# Patient Record
Sex: FEMALE | Race: WHITE | NOT HISPANIC OR LATINO | Employment: UNEMPLOYED | URBAN - NONMETROPOLITAN AREA
[De-identification: names, ages, dates, MRNs, and addresses within clinical notes are randomized per-mention and may not be internally consistent; named-entity substitution may affect disease eponyms.]

---

## 2018-04-11 ENCOUNTER — OFFICE VISIT CONVERTED (OUTPATIENT)
Dept: FAMILY MEDICINE CLINIC | Age: 63
End: 2018-04-11
Attending: FAMILY MEDICINE

## 2018-12-06 ENCOUNTER — OFFICE VISIT CONVERTED (OUTPATIENT)
Dept: FAMILY MEDICINE CLINIC | Age: 63
End: 2018-12-06
Attending: FAMILY MEDICINE

## 2018-12-10 ENCOUNTER — CONVERSION ENCOUNTER (OUTPATIENT)
Dept: OTHER | Facility: HOSPITAL | Age: 63
End: 2018-12-10

## 2019-10-17 ENCOUNTER — HOSPITAL ENCOUNTER (OUTPATIENT)
Dept: OTHER | Facility: HOSPITAL | Age: 64
Discharge: HOME | End: 2019-10-17
Attending: FAMILY MEDICINE

## 2019-10-17 LAB
ALBUMIN SERPL-MCNC: 4.6 G/DL (ref 3.5–5)
ALBUMIN/GLOB SERPL: 1.9 {RATIO} (ref 1.4–2.6)
ALP SERPL-CCNC: 59 U/L (ref 43–160)
ALT SERPL-CCNC: 34 U/L (ref 10–40)
ANION GAP SERPL CALC-SCNC: 19 MMOL/L (ref 8–19)
AST SERPL-CCNC: 25 U/L (ref 15–50)
BILIRUB SERPL-MCNC: 0.5 MG/DL (ref 0.2–1.3)
BUN SERPL-MCNC: 15 MG/DL (ref 5–25)
BUN/CREAT SERPL: 18 {RATIO} (ref 6–20)
CALCIUM SERPL-MCNC: 9.9 MG/DL (ref 8.7–10.4)
CHLORIDE SERPL-SCNC: 101 MMOL/L (ref 99–111)
CHOLEST SERPL-MCNC: 187 MG/DL (ref 107–200)
CHOLEST/HDLC SERPL: 3.4 {RATIO} (ref 3–6)
CONV CO2: 22 MMOL/L (ref 22–32)
CONV TOTAL PROTEIN: 7 G/DL (ref 6.3–8.2)
CREAT UR-MCNC: 0.82 MG/DL (ref 0.5–0.9)
GFR SERPLBLD BASED ON 1.73 SQ M-ARVRAT: >60 ML/MIN/{1.73_M2}
GLOBULIN UR ELPH-MCNC: 2.4 G/DL (ref 2–3.5)
GLUCOSE SERPL-MCNC: 120 MG/DL (ref 65–99)
HDLC SERPL-MCNC: 55 MG/DL (ref 40–60)
LDLC SERPL CALC-MCNC: 93 MG/DL (ref 70–100)
OSMOLALITY SERPL CALC.SUM OF ELEC: 288 MOSM/KG (ref 273–304)
POTASSIUM SERPL-SCNC: 4.3 MMOL/L (ref 3.5–5.3)
SODIUM SERPL-SCNC: 138 MMOL/L (ref 135–147)
TRIGL SERPL-MCNC: 195 MG/DL (ref 40–150)
VLDLC SERPL-MCNC: 39 MG/DL (ref 5–37)

## 2019-11-06 ENCOUNTER — OFFICE VISIT CONVERTED (OUTPATIENT)
Dept: FAMILY MEDICINE CLINIC | Age: 64
End: 2019-11-06
Attending: FAMILY MEDICINE

## 2020-10-29 ENCOUNTER — HOSPITAL ENCOUNTER (OUTPATIENT)
Dept: OTHER | Facility: HOSPITAL | Age: 65
Discharge: HOME | End: 2020-10-29
Attending: FAMILY MEDICINE

## 2020-10-29 LAB
ALBUMIN SERPL-MCNC: 4.3 G/DL (ref 3.5–5)
ALBUMIN/GLOB SERPL: 1.9 {RATIO} (ref 1.4–2.6)
ALP SERPL-CCNC: 57 U/L (ref 43–160)
ALT SERPL-CCNC: 17 U/L (ref 10–40)
ANION GAP SERPL CALC-SCNC: 14 MMOL/L (ref 8–19)
AST SERPL-CCNC: 16 U/L (ref 15–50)
BILIRUB SERPL-MCNC: 0.56 MG/DL (ref 0.2–1.3)
BUN SERPL-MCNC: 22 MG/DL (ref 5–25)
BUN/CREAT SERPL: 24 {RATIO} (ref 6–20)
CALCIUM SERPL-MCNC: 10 MG/DL (ref 8.7–10.4)
CHLORIDE SERPL-SCNC: 102 MMOL/L (ref 99–111)
CHOLEST SERPL-MCNC: 134 MG/DL (ref 107–200)
CHOLEST/HDLC SERPL: 2.5 {RATIO} (ref 3–6)
CONV CO2: 26 MMOL/L (ref 22–32)
CONV TOTAL PROTEIN: 6.6 G/DL (ref 6.3–8.2)
CREAT UR-MCNC: 0.91 MG/DL (ref 0.5–0.9)
GFR SERPLBLD BASED ON 1.73 SQ M-ARVRAT: >60 ML/MIN/{1.73_M2}
GLOBULIN UR ELPH-MCNC: 2.3 G/DL (ref 2–3.5)
GLUCOSE SERPL-MCNC: 105 MG/DL (ref 65–99)
HDLC SERPL-MCNC: 54 MG/DL (ref 40–60)
LDLC SERPL CALC-MCNC: 66 MG/DL (ref 70–100)
OSMOLALITY SERPL CALC.SUM OF ELEC: 290 MOSM/KG (ref 273–304)
POTASSIUM SERPL-SCNC: 4.2 MMOL/L (ref 3.5–5.3)
SODIUM SERPL-SCNC: 138 MMOL/L (ref 135–147)
TRIGL SERPL-MCNC: 71 MG/DL (ref 40–150)
VLDLC SERPL-MCNC: 14 MG/DL (ref 5–37)

## 2020-11-05 ENCOUNTER — OFFICE VISIT CONVERTED (OUTPATIENT)
Dept: FAMILY MEDICINE CLINIC | Age: 65
End: 2020-11-05
Attending: FAMILY MEDICINE

## 2020-12-16 ENCOUNTER — OFFICE VISIT CONVERTED (OUTPATIENT)
Dept: FAMILY MEDICINE CLINIC | Age: 65
End: 2020-12-16
Attending: FAMILY MEDICINE

## 2021-04-26 ENCOUNTER — HOSPITAL ENCOUNTER (OUTPATIENT)
Dept: OTHER | Facility: HOSPITAL | Age: 66
Discharge: HOME | End: 2021-04-26
Attending: FAMILY MEDICINE

## 2021-04-26 ENCOUNTER — OFFICE VISIT CONVERTED (OUTPATIENT)
Dept: FAMILY MEDICINE CLINIC | Age: 66
End: 2021-04-26
Attending: FAMILY MEDICINE

## 2021-04-27 ENCOUNTER — HOSPITAL ENCOUNTER (OUTPATIENT)
Dept: OTHER | Facility: HOSPITAL | Age: 66
Discharge: HOME | End: 2021-04-27
Attending: FAMILY MEDICINE

## 2021-04-29 LAB
CONV LAST MENSTURAL PERIOD: NORMAL
HPV HYBRID CAPTURE HIGH RISK: NEGATIVE
SPECIMEN SOURCE: NORMAL
SPECIMEN SOURCE: NORMAL
THIN PREP CVX: NORMAL

## 2021-05-18 NOTE — PROGRESS NOTES
Senait Fong  1955     Office/Outpatient Visit    Visit Date: Wed, Dec 16, 2020 10:08 am    Provider: Yas Castillo MD (Assistant: Jennifer Espinosa, RN)    Location: [facility]        Electronically signed by Yas Castillo MD on  12/17/2020 04:41:19 PM                             Subjective:        CC: Joanna is a 65 year old White female.  [facility] [place] [facility]; [employer] VIDEO- [phone]        HPI:           Joanna is here for a Medicare wellness visit.  The required HRA questions are integrated within this visit note. Family medical history and individual medical/surgical history were reviewed and updated.  A current height, weight, BMI, blood pressure, and pulse were recorded in the vitals section of the note and have been reviewed. Patient's medications, including supplements, were recorded in the chart and reviewed.  Current providers and suppliers were reviewed and updated.          Self-Assessment of Health: She rates her health as excellent. She rates her confidence of being able to control/manage most of her health problems as very confident. Her physical/emotional health has limited her social activites not at all.  A review of cognitive impairment was performed, including ability to drive a car, manage finances, and any memory changes, and was found to be negative.  A review of functional ability, including bathing, dressing, walking, and urine/bowel continence as well as level of safety was performed and was found to be negative.  Falls Risk: Has not had any falls or only one fall without injury in the past year.  In regard to hearing, she reports having trouble hearing the TV/radio when others do not and having to strain to hear or understand conversations, but not wearing hearing aid(s).  Concerning home safety, she reports that at home she DOES have adequate lighting, a skid resistant shower/tub, grab bars in the bath, handrails on stairs and functioning smoke alarms, but not  absence of throw rugs.          Immunization Status: ** >10 years since last Td booster; ** Has not received pneumococcal vaccination; ** Has not received Prevnar 13 vaccination; Age>60, no shingles vaccination; Physical Activity: She exercises for at least 20 minutes 3 or more days/week.; Type of diet patient normally eats is described as well-balanced with fruits and vegetables Tobacco: She has a past history of cigarette smoking; quit date:  .  Preventative Health updated today           PHQ-9 Depression Screening: Completed form scanned and in chart; Total Score 0     ROS:     CONSTITUTIONAL:  Positive for fatigue.   Negative for chills or fever.      E/N/T:  Negative for hearing problems, E/N/T pain, congestion, rhinorrhea, epistaxis, hoarseness, and dental problems.      CARDIOVASCULAR:  Negative for chest pain.      RESPIRATORY:  Negative for cough, dyspnea, and hemoptysis.  RECENT COVID EXPOSURE WITH NORMAL COVID EXAM    GASTROINTESTINAL:  Negative for abdominal pain, heartburn, constipation, diarrhea, and stool changes.      GENITOURINARY:  Negative for dysuria and vaginal discharge.      INTEGUMENTARY/BREAST:  Negative for rash.      NEUROLOGICAL:  Negative for dizziness, headaches, paresthesias, and weakness.      PSYCHIATRIC:  Negative for anxiety, depression, and sleep disturbances.          Past Medical History / Family History / Social History:         Last Reviewed on 2020 10:59 AM by Yas Castillo    Past Medical History:                 PAST MEDICAL HISTORY         Hyperlipidemia     Skin cancer: UNKNOWN;     depression         GYNECOLOGICAL HISTORY:     miscarriage 1    Menopause at age 51.          PREVENTIVE HEALTH MAINTENANCE             BONE DENSITY: was last done 12-10-18 with normal results     COLORECTAL CANCER SCREENING: colonoscopy with the following abnormalities noted-- Diverticulosis; 3/4/11     EYE EXAM: was last done      MAMMOGRAM: was last done 20      PAP SMEAR: was last done 12/6/18 hx abnormal, biopsy normal         PAST MEDICAL HISTORY             CURRENT MEDICAL PROVIDERS:    Dentist: dr collazo    Ophthalmologist: Albert         Surgical History:     bunionectomy Jan 2015 L, Sept 2015 R     Uterine biopsy 2015         Cholecystectomy     breast bx nml; Procedures: colonoscopy 2011 Prior gynecologic procedures include cervical cryotherapy x2.          Family History:         Positive for Myocardial Infarction ( pat. GF ).      Positive for Colon Cancer ( mat. GM ).      Positive for COPD ( mat. GF ).      Positive for Crohn's Disease ( mother ).          Social History:     Occupation: Unemployed     Marital Status:      Children: 2 children     Exercise: Primary form of exercise is walking and gym at home.          Tobacco/Alcohol/Supplements:     Last Reviewed on 12/16/2020 10:09 AM by Jennifer Espinosa    Tobacco: She has a past history of cigarette smoking; quit date:  1997.      Caffeine:  She admits to consuming caffeine via soda ( 1 serving per day ).          Substance Abuse History:     Last Reviewed on 2/10/2017 09:10 AM by Cally Marcano    NEGATIVE         Mental Health History:     Last Reviewed on 2/10/2017 09:10 AM by Cally Marcano        Communicable Diseases (eg STDs):     Last Reviewed on 2/10/2017 09:10 AM by Cally Marcano        Immunizations:     influenza, high-dose, quadrivalent (FLUZONE HIGH-DOSE QUAD 2020-21) 11/5/2020        Allergies:     Last Reviewed on 12/16/2020 10:09 AM by Jennifer Espinosa    Codeine Sulfate:          Current Medications:     Last Reviewed on 12/16/2020 10:09 AM by Jennifer Espinosa    Loratadine 10 mg oral tablet [1 tab daily]    citalopram 20 mg oral tablet [TAKE 1 TABLET AT BEDTIME]    cranberry     Multivitamin daily     melatonin 5 mg 2 po qhs     Fish Oil 360-1,200 mg oral capsule [Take 1 capsule(s) by mouth daily]    OTC calcium chew     gummy probiotic         Objective:        Vitals:         Current:  2/8/2011 2:56:11 PM    Ht:  5 ft, 2.5 in;  Wt: 148 lbs;  BMI: 26.6T: 98 F (temporal);  BP: 121/73 mm Hg (left arm, sitting);  R: 16 bpm;  sCr: 0.78 mg/dL;  GFR: 69.05        Exams:     PHYSICAL EXAM:     GENERAL:  well developed and nourished; appropriately groomed; in no apparent distress;     EYES: extraocular movements intact; conjunctiva and cornea are normal;     RESPIRATORY: normal respiratory rate and pattern with no distress;     MUSCULOSKELETAL: normal overall tone     NEUROLOGIC: mental status: alert and oriented x 3;     PSYCHIATRIC: appropriate affect and demeanor; normal psychomotor function; normal speech pattern;         Lab/Test Results:         Glucose, Serum: 105 (mg/dL) (10/29/2020),     Creatinine, Serum: 0.91 (mg/dl) (10/29/2020),     Glom Filt Rate, Est: >60 (ml/min/1.73m2) (10/29/2020),     Alkaline Phosphatase, Serum: 57 (U/L) (10/29/2020),     ALT (SGPT): 17 (U/L) (10/29/2020),     AST (SGOT): 16 (U/L) (10/29/2020),     Total Cholesterol: 134 (mg/dL) (10/29/2020),     HDL: 54 (mg/dL) (10/29/2020),     Triglycerides: 71 (mg/dL) (10/29/2020),     LDL: 66 (mg/dL) (10/29/2020),             Assessment:         Z00.00   Encounter for general adult medical examination without abnormal findings       Z13.31   Encounter for screening for depression       Z78.0   Asymptomatic menopausal state           ORDERS:         Radiology/Test Orders:       3017F  Colorectal CA screen results documented and reviewed (PV)  (In-House)            49117  DXA, bone density study, 1 or more sites; axial skeleton (eg hips, pelvis, spine)  (Send-Out)              Procedures Ordered:         Annual wellness visit, includes a PPPS, subsequent visit  (In-House)              Other Orders:         Depression screen negative  (In-House)            1101F  Pt screen for fall risk; document no falls in past year or only 1 fall w/o injury in past year (IVA)  (In-House)              Screening mammogram results  documented  (Send-Out)                      Plan:         Encounter for general adult medical examination without abnormal findingsMEDICARE WELLNESS EXAM-SHE IS due for her MAMMOGRAM/DEXA in April, UTD ON COLONOSCOPY, due for  PAP AND PELVIC EXAM NOW, SHE DEFERS VACCINATIONS INCLUDING:  SHINGLES VACCINE/PREVNAR/ PNEUMOVAX/FLU VACCINE, NO FALL RISK, NO MEMORY ISSUES, NO SIGNS/SYMPTOMS OF DEPRESSION, SHE LIVES WITH HER , SHE IS ABLE TO DRIVE AND PERFORM ADL'S/FINANCES INDEPENDENTLY, HEARING IS DECREASED-PT DEFERS SCREENING TEST, SHE HAS A LIVING WILL AND A PREV SERVICES HANDOUT AND SAFETY HANDOUT WERE GIVEN TO HER.  CURRENT DOCTOR LIST UPDATED.  RECOMMEND YEARLY EYE EXAMS AND  WT IS STABLE    ADVANCED DIRECTIVES: None               Orders:         Annual wellness visit, includes a PPPS, subsequent visit  (In-House)              Encounter for screening for depression    MIPS PHQ-9 Depression Screening: Completed form scanned and in chart; Total Score 0; Negative Depression Screen           Orders:         Depression screen negative  (In-House)            1101F  Pt screen for fall risk; document no falls in past year or only 1 fall w/o injury in past year (IVA)  (In-House)              Screening mammogram results documented  (Send-Out)            3017F  Colorectal CA screen results documented and reviewed (PV)  (In-House)              Asymptomatic menopausal state          Orders:       13586  DXA, bone density study, 1 or more sites; axial skeleton (eg hips, pelvis, spine)  (Send-Out)                  Charge Capture:         Primary Diagnosis:     Z00.00  Encounter for general adult medical examination without abnormal findings           Orders:        Annual wellness visit, includes a PPPS, subsequent visit  (In-House)              Z13.31  Encounter for screening for depression           Orders:        Depression screen negative  (In-House)            1101F  Pt screen for fall risk;  document no falls in past year or only 1 fall w/o injury in past year (IVA)  (In-House)            3017F  Colorectal CA screen results documented and reviewed (PV)  (In-House)              Z78.0  Asymptomatic menopausal state

## 2021-05-18 NOTE — PROGRESS NOTES
Senait Fong  1955     Office/Outpatient Visit    Visit Date: Mon, Apr 26, 2021 01:10 pm    Provider: Yas Castillo MD (Assistant: Analia Brooke MA)    Location: [facility]        Electronically signed by Yas Castillo MD on  04/27/2021 03:48:23 PM                             Subjective:        CC: WWE    HPI:           Joanna presents with encounter for gynecological examination (general) (routine) without abnormal findings.  Her last physical exam was 6 months ago.  She is menopausal.  She is not currently using any form of contraception.  She performs breast self-exams occasionally.   Her last Pap smear was in 12-6-18 and was normal.   Her last mammogram was in 4-26-21.   Her last DEXA was in 4-26-21.   She underwent colonoscopy in 2011.   Preventative Health updated today.  Joanna has had an abnormal Pap smear in the past.  Tobacco: Former smoker           PHQ-9 Depression Screening: Completed form scanned and in chart; Total Score 0     ROS:     CONSTITUTIONAL:  Negative for chills, fatigue and fever.      E/N/T:  Negative for hearing problems, E/N/T pain, congestion, rhinorrhea, epistaxis, hoarseness, and dental problems.      CARDIOVASCULAR:  Negative for chest pain.      RESPIRATORY:  Negative for cough, dyspnea, and hemoptysis.      GASTROINTESTINAL:  Negative for abdominal pain, heartburn, constipation, diarrhea, and stool changes.      GENITOURINARY:  Negative for dysuria and vaginal discharge.      INTEGUMENTARY/BREAST:  Negative for rash.      NEUROLOGICAL:  Negative for dizziness, headaches, paresthesias, and weakness.      PSYCHIATRIC:  Negative for anxiety, depression, and sleep disturbances.          Past Medical History / Family History / Social History:         Last Reviewed on 4/26/2021 01:32 PM by Yas Castillo    Past Medical History:                 PAST MEDICAL HISTORY         Hyperlipidemia     Skin cancer: UNKNOWN;     depression          GYNECOLOGICAL HISTORY:     miscarriage 1    Menopause at age 51.          PREVENTIVE HEALTH MAINTENANCE             BONE DENSITY: was last done 12-10-18 with normal results     COLORECTAL CANCER SCREENING: colonoscopy with the following abnormalities noted-- Diverticulosis; 3/4/11     EYE EXAM: was last done 2019-MD in [facility]     MAMMOGRAM: was last done 20     PAP SMEAR: was last done 18 hx abnormal, biopsy normal         PAST MEDICAL HISTORY             CURRENT MEDICAL PROVIDERS:    Dentist: dr collazo    Ophthalmologist: Albert         Surgical History:     bunionectomy 2015 L, 2015 R     Uterine biopsy          Cholecystectomy     breast bx nml; Procedures: colonoscopy  Prior gynecologic procedures include cervical cryotherapy x2.          Family History:         Positive for Myocardial Infarction ( pat. GF ).      Positive for Colon Cancer ( mat. GM ).      Positive for COPD ( mat. GF ).      Positive for Crohn's Disease ( mother ).          Social History:     Occupation: Unemployed     Marital Status:      Children: 2 children     Exercise: Primary form of exercise is walking and gym at home.          Tobacco/Alcohol/Supplements:     Last Reviewed on 2021 01:18 PM by Analia Brooke    Tobacco: She has a past history of cigarette smoking; quit date:  .      Caffeine:  She admits to consuming caffeine via soda ( 1 serving per day ).          Substance Abuse History:     Last Reviewed on 2/10/2017 09:10 AM by Cally Marcano    NEGATIVE         Mental Health History:     Last Reviewed on 2/10/2017 09:10 AM by Cally Marcano        Communicable Diseases (eg STDs):     Last Reviewed on 2/10/2017 09:10 AM by Cally Marcano        Allergies:     Last Reviewed on 2021 01:18 PM by Analia Brooke    Codeine Sulfate:          Current Medications:     Last Reviewed on 2021 01:18 PM by Analia Brooke    Loratadine 10 mg oral tablet [1 tab daily]    citalopram 20 mg  oral tablet [TAKE 1 TABLET AT BEDTIME]    cranberry     Multivitamin daily     melatonin 5 mg 2 po qhs     Fish Oil 360-1,200 mg oral capsule [Take 1 capsule(s) by mouth daily]    OTC calcium chew     gummy probiotic         Objective:        Vitals:         Current: 4/26/2021 1:14:53 PM    Ht:  5 ft, 1.75 in;  Wt: 153.4 lbs;  BMI: 28.3T: 97.3 F (temporal);  BP: 127/68 mm Hg (left arm, sitting);  P: 54 bpm (left arm (BP Cuff), sitting);  sCr: 0.91 mg/dL;  GFR: 58.79        Exams:     PHYSICAL EXAM:     GENERAL: vital signs recorded - well developed, well nourished;  no apparent distress;     EYES: extraocular movements intact; conjunctiva and cornea are normal; PERRLA;     E/N/T:  normal EACs, TMs, nasal/oral mucosa, teeth, gingiva, and oropharynx;     NECK: range of motion is normal; thyroid is non-palpable;     RESPIRATORY: normal respiratory rate and pattern with no distress; normal breath sounds with no rales, rhonchi, wheezes or rubs;     CARDIOVASCULAR: normal rate; rhythm is regular;  no systolic murmur; no edema;     GASTROINTESTINAL: nontender; normal bowel sounds; no organomegaly; rectal exam: normal tone; nontender, guaiac negative stool;     GENITOURINARY:  normal appearance of external genitalia, urethra, and cervix; no cervical motion tenderness; no adnexal tenderness or masses on bimanual exam;     LYMPHATIC: no enlargement of cervical or facial nodes; no supraclavicular nodes;     BREAST/INTEGUMENT: BREASTS: breast exam is normal without masses, skin changes, or nipple discharge; SKIN: no significant rashes or lesions; no suspicious moles;     MUSCULOSKELETAL:  Normal range of motion, strength and tone;     NEUROLOGICAL:  cranial nerves, motor and sensory function, reflexes, gait and coordination are all intact;     PSYCHIATRIC:  appropriate affect and demeanor; normal speech pattern; grossly normal memory;         Lab/Test Results:         Glucose, Urine: Neg (04/26/2021),     Bilirubin, urine:  Negative (04/26/2021),     Ketones, Urine Strip: Negative (04/26/2021),     Specific Gravity, urine: 1.025 (04/26/2021),     Blood in Urine: negative (04/26/2021),     pH, urine: 6.5 (04/26/2021),     Protein Urine QL: negative (04/26/2021),     Urobilinogen, urine: 0.2 E.U./dL (04/26/2021),     Nitrite, Urine: Negative (04/26/2021),     Leukoctyes, urine: Negative (04/26/2021),     Appearance: Clear (04/26/2021),     collection source: Clean-catch (04/26/2021),     Color: Yellow (04/26/2021),     Performed by:: al (04/26/2021),             Assessment:         Z01.419   Encounter for gynecological examination (general) (routine) without abnormal findings       Z13.31   Encounter for screening for depression       Z12.31   Encounter for screening mammogram for malignant neoplasm of breast       Z78.0   Asymptomatic menopausal state       Z12.11   Encounter for screening for malignant neoplasm of colon       N90.5   Atrophy of vulva           ORDERS:         Meds Prescribed:       [Recorded] estradioL 0.01 % (0.1 mg/gram) Vaginal Cream [insert 1 gram by vaginal route daily for 1 week, 2 x per week x 2 weeks then once a week]       [Refilled] estradioL 0.01 % (0.1 mg/gram) Vaginal Cream [insert 1 gram by vaginal route daily for 1 week, 2 x per week x 2 weeks then once a week], #42.5 (forty two point five) grams, Refills: 2 (two)         Radiology/Test Orders:       3017F  Colorectal CA screen results documented and reviewed (PV)  (In-House)            00166  Screening digital breast tomosynthesis bi  (Send-Out)            12888  Colonoscopy, flexible; screening-Dr Angelo  (Send-Out)            64749  DXA, bone density study, 1 or more sites; axial skeleton (eg hips, pelvis, spine)  (Send-Out)              Lab Orders:       74270  Urinalysis, automated, without microscopy  (In-House)              Procedures Ordered:       21242  Cytopathology, cervix/vagina collect in preservative, auto thin layer prep, arthur Mario  supervision  (Send-Out)            54553  Handlg&/or convey of spec for TR office to lab  (In-House)              Other Orders:         Depression screen negative  (In-House)              Screening mammogram results documented  (Send-Out)                      Plan:         Encounter for gynecological examination (general) (routine) without abnormal findingshealthy, recommend yearly flu vaccination, pap performed, breast exam WNL, she is refered for screening colonoscopoy/mammogram and dexa          Orders:       95497  Urinalysis, automated, without microscopy  (In-House)            67413  Cytopathology, cervix/vagina collect in preservative, auto thin layer prep, racheln w/MD supervision  (Send-Out)            67652  Handlg&/or convey of spec for TR office to lab  (In-House)              Encounter for screening for depression    MIPS PHQ-9 Depression Screening: Completed form scanned and in chart; Total Score 0; Negative Depression Screen           Orders:         Depression screen negative  (In-House)              Screening mammogram results documented  (Send-Out)            3017F  Colorectal CA screen results documented and reviewed (PV)  (In-House)              Encounter for screening mammogram for malignant neoplasm of breast          Orders:       41328  Screening digital breast tomosynthesis bi  (Send-Out)              Asymptomatic menopausal state          Orders:       26285  DXA, bone density study, 1 or more sites; axial skeleton (eg hips, pelvis, spine)  (Send-Out)              Encounter for screening for malignant neoplasm of colonshe is due 2021-will schedule in the Fall        TESTS/PROCEDURES:  Will proceed with a colonoscopy to be performed/scheduled now.            Orders:       83561  Colonoscopy, flexible; screening-Dr Angelo  (Send-Out)              Atrophy of vulva          Prescriptions:       [Recorded] estradioL 0.01 % (0.1 mg/gram) Vaginal Cream [insert 1 gram by vaginal  route daily for 1 week, 2 x per week x 2 weeks then once a week]       [Refilled] estradioL 0.01 % (0.1 mg/gram) Vaginal Cream [insert 1 gram by vaginal route daily for 1 week, 2 x per week x 2 weeks then once a week], #42.5 (forty two point five) grams, Refills: 2 (two)             Charge Capture:         Primary Diagnosis:     Z01.419  Encounter for gynecological examination (general) (routine) without abnormal findings           Orders:      75995  Urinalysis, automated, without microscopy  (In-House)            36736  Handlg&/or convey of spec for TR office to lab  (In-House)              Z13.31  Encounter for screening for depression           Orders:        Depression screen negative  (In-House)            3017F  Colorectal CA screen results documented and reviewed (PV)  (In-House)              Z12.31  Encounter for screening mammogram for malignant neoplasm of breast     Z78.0  Asymptomatic menopausal state     Z12.11  Encounter for screening for malignant neoplasm of colon     N90.5  Atrophy of vulva

## 2021-05-18 NOTE — PROGRESS NOTES
Senait Fong 1955     Office/Outpatient Visit    Visit Date: Thu, Dec 6, 2018 10:49 am    Provider: Yas Castillo MD (Assistant: Hoa Parsons MA)    Location: [facility]        Electronically signed by Yas Castillo MD on  12/10/2018 09:34:50 AM                             SUBJECTIVE:        CC: (PATIENT IS NOT TAKING VIACTIVE 1000)         HPI:         Her last gynecological exam was one year ago.  Her last Pap smear was 1 year ago.  Her last mammogram was 1 year ago.  DEXA scan on over two years ago and was normal..  She is not currently using any form of contraception.  She performs breast self-exams occasionally.  ( PATIENT STATES SHE HAD PAP AND MAMMO LAST YEAR ORDERED BY DR. RAVIN PATRICK) She is current with her Td.  She is not current with influenza immunization.  COLONOSCOPY NO POLYPS 2011 DR HUMPHREY             PHQ-9 Depression Screening: Completed form scanned and in chart; Total Score 0 Alcohol Consumption Screening: Completed form scanned and in chart; Total Score 4         Dx with mixed hyperlipidemia; current treatment includes Crestor,  a low cholesterol/low fat diet, and fish oil.  Compliance with treatment has been good; she takes her medication as directed, maintains her low cholesterol diet, follows up as directed, and maintains her exercise regimen.  She denies experiencing any hypercholesterolemia related symptoms.  Most recent lab tests include Creatinine, Serum:  0.81 (mg/dl) (12/05/2018), Glom Filt Rate, Est:  >60 (ml/min/1.73m2) (12/05/2018), Alkaline Phosphatase, Serum:  52 (U/L) (12/05/2018), ALT (SGPT):  22 (U/L) (12/05/2018), AST (SGOT):  18 (U/L) (12/05/2018), Total Cholesterol:  196 (mg/dL) (12/05/2018), HDL:  57 (mg/dL) (12/05/2018), Triglycerides:  147 (mg/dL) (12/05/2018), LDL:  110 (mg/dL) (12/05/2018).      ROS:     CONSTITUTIONAL:  Negative for chills, fatigue, fever, and weight change.      EYES:  Negative for blurred vision, eye pain, and photophobia.       E/N/T:  Negative for hearing problems, E/N/T pain, congestion, rhinorrhea, epistaxis, hoarseness, and dental problems.      CARDIOVASCULAR:  Negative for chest pain, palpitations, tachycardia, orthopnea, and edema.      RESPIRATORY:  Negative for cough, dyspnea, and hemoptysis.      GASTROINTESTINAL:  Negative for abdominal pain, heartburn, constipation, diarrhea, and stool changes.      GENITOURINARY:  Negative for genital lesions, hematuria, menstrual problems, polyuria, abnormal vaginal bleeding, and vaginal discharge.      MUSCULOSKELETAL:  Negative for arthralgias, back pain, and myalgias.      INTEGUMENTARY/BREAST:  Negative for atypical moles, dry skin, pruritis, rashes, breast masses, and nipple discharge.      NEUROLOGICAL:  Negative for dizziness, headaches, paresthesias, and weakness.      PSYCHIATRIC:  Negative for anxiety, depression, and sleep disturbances.          PMH/FMH/SH:     Last Reviewed on 2018 11:51 AM by Yas Castillo    Past Medical History:                 PAST MEDICAL HISTORY         Hyperlipidemia     Skin cancer: UNKNOWN;     depression         GYNECOLOGICAL HISTORY:     miscarriage 1    Menopause at age 51.          PREVENTIVE HEALTH MAINTENANCE             COLONOSCOPY: Done within the last 10 years was last done      MAMMOGRAM: was last done  with normal results     BONE DENSITY: was last done  with normal results     PAP SMEAR: was last done 2016 hx abnormal, biopsy normal     INFLUENZA VACCINE: declines, understands reason for immunization         Surgical History:     bunionectomy 2015 L, 2015 R     Uterine biopsy          Cholecystectomy      breast bx nml; Procedures: colonoscopy  Prior gynecologic procedures include cervical cryotherapy x2.          Family History:         Positive for Myocardial Infarction ( pat. GF ).      Positive for Colon Cancer ( mat. GM ).      Positive for COPD ( mat. GF ).      Positive for Crohn's Disease  ( mother ).          Social History:     Occupation: Unemployed     Marital Status:      Children: 2 children     Exercise: Primary form of exercise is walking and gym at home.          Tobacco/Alcohol/Supplements:     Last Reviewed on 12/06/2018 11:51 AM by Yas Castillo    Tobacco: She has a past history of cigarette smoking; quit date:  1997.      Caffeine:  She admits to consuming caffeine via soda ( 1 serving per day ).          Substance Abuse History:     Last Reviewed on 2/10/2017 09:10 AM by Cally Marcano    NEGATIVE         Mental Health History:     Last Reviewed on 2/10/2017 09:10 AM by Cally Marcano        Communicable Diseases (eg STDs):     Last Reviewed on 2/10/2017 09:10 AM by Cally Marcano            Immunizations:     None        Allergies:     Last Reviewed on 4/11/2018 08:34 AM by Yue West    Codeine Sulfate:        Current Medications:     Last Reviewed on 4/11/2018 08:34 AM by Yue West    Citalopram Hydrobromide 20mg Tablet take 1  PO QHS     Progesterone 100mg Capsules 1 capsule daily     Fish Oil 1,200mg Softgel capsule Take 1 capsule(s) by mouth daily     Loratadine 10mg Tablet 1 tab daily     melatonin 5 mg 2 po qhs     viactive 1000mg 1 po daily     Multivitamin daily     Minivelle 0.0375mg Transdermal Patch Apply 1 patch(es) to lower abdomen 2 times weekly     cranberry         OBJECTIVE:        Vitals:         Current: 12/6/2018 11:01:18 AM    Ht:  5 ft, 1.75 in;  Wt: 190.4 lbs;  BMI: 35.1    T: 98.6 F (oral);  BP: 149/70 mm Hg (right arm, sitting);  P: 57 bpm (right arm (BP Cuff), sitting);  sCr: 0.81 mg/dL;  GFR: 75.28        Repeat:     12:19:02 PM     BP:   132/86mm Hg (right arm, sitting)         Exams:     PHYSICAL EXAM:     GENERAL: vital signs recorded - well developed, well nourished;  no apparent distress;     EYES: extraocular movements intact; conjunctiva and cornea are normal; PERRLA;     E/N/T:  normal EACs, TMs, nasal/oral mucosa, teeth, gingiva, and  oropharynx;     NECK: range of motion is normal; thyroid is non-palpable;     RESPIRATORY: normal respiratory rate and pattern with no distress; normal breath sounds with no rales, rhonchi, wheezes or rubs;     CARDIOVASCULAR: normal rate; rhythm is regular;  no systolic murmur; no edema;     GASTROINTESTINAL: nontender; normal bowel sounds; no organomegaly;     GENITOURINARY:  normal appearance of external genitalia, urethra, and cervix; no cervical motion tenderness; no adnexal tenderness or masses on bimanual exam;     LYMPHATIC: no enlargement of cervical or facial nodes; no supraclavicular nodes;     BREAST/INTEGUMENT: BREASTS: breast exam is normal without masses, skin changes, or nipple discharge; SKIN: no significant rashes or lesions; no suspicious moles;     MUSCULOSKELETAL:  Normal range of motion, strength and tone;     NEUROLOGICAL:  cranial nerves, motor and sensory function, reflexes, gait and coordination are all intact;     PSYCHIATRIC:  appropriate affect and demeanor; normal speech pattern; grossly normal memory;         Lab/Test Results:             Glucose, Urine:  Neg (12/06/2018),     Bilirubin, urine:  Negative (12/06/2018),     Ketones, Urine Strip:  Negative (12/06/2018),     Specific Gravity, urine:  1.015 (12/06/2018),     Blood in Urine:  negative (12/06/2018),     pH, urine:  7.0 (12/06/2018),     Protein Urine QL:  negative (12/06/2018),     Urobilinogen, urine:  0.2 E.U./dL (12/06/2018),     Nitrite, Urine:  Negative (12/06/2018),     Leukoctyes, urine:  Negative (12/06/2018),     Appearance:  Clear (12/06/2018),     collection source:  Clean-catch (12/06/2018),     Color:  Yellow (12/06/2018),     Performed by::  mnp @1113 (12/06/2018),             ASSESSMENT:           V72.31     Well Woman Exam     V79.0   Z13.89  Screening for depression              DDx:     V07.4   Z79.890  Post-menopausal HRT              DDx:     V76.12   Z12.31  Screening mammogram - other              DDx:      V76.49   Z12.11  Screening for colorectal cancer              DDx:     627.2   N95.1  Menopause              DDx:     300.4   F32.5  Depression with anxiety              DDx:     272.2   E78.2  Mixed hyperlipidemia              DDx:         ORDERS:         Meds Prescribed:       Refill of: Citalopram Hydrobromide 20mg Tablet take 1  PO QHS  #90 (Ninety) tablet(s) Refills: 1       Refill of: Progesterone 100mg Capsules 1 qod for a month, then twice a week for a month then stop  #30 (Thirty) capsule(s) Refills: 0       Refill of: Crestor (Rosuvastatin) 10mg Tablet 1 tab daily  #90 (Ninety) tablet(s) Refills: 1       Refill of: Minivelle (Estradiol) 0.025mg Transdermal Patch USE ONE PATCH TWICE A WEEK FOR A MONTH THEN STOP  #8 (Eight) patch(es) Refills: 0         Radiology/Test Orders:       02835  Screening mammography bi 2-view inc CAD  (Send-Out)         3017F  Colorectal CA screen results documented and reviewed (PV)  (In-House)         84199  DXA, bone density study, 1 or more sites; axial skeleton (eg hips, pelvis, spine)  (Send-Out)           Lab Orders:       45119  Cytopathology, slides, cervical or vaginal; manual screening under MD supervision  (Send-Out)         32617  Urinalysis, automated, without microscopy  (In-House)         75020  Hemoccult  (In-House)           Procedures Ordered:       74323  Handlg&/or convey of spec for TR office to lab  (In-House)           Other Orders:       1101F  Pt screen for fall risk; document no falls in past year or only 1 fall w/o injury in past year (IVA)  (In-House)           Calculated BMI above the upper parameter and a follow-up plan was documented in the medical record  (In-House)           Negative EtOH screen  (In-House)                   PLAN:          Well Woman Exam PAP performed, breast exam WNL she defers her flu vaccination           Orders:      99893  Cytopathology, slides, cervical or vaginal; manual screening under MD supervision   (Send-Out)         53346  Urinalysis, automated, without microscopy  (In-House)         66158  Handlg&/or convey of spec for TR office to lab  (In-House)            Screening for depression     MIPS Current diagnosis of depression and/or bipolar disorder Positive alcohol screen discussed minimal use, no concerns at this time.      COLORECTAL CANCER SCREENING: Results are in chart     BMI Elevated - Follow-Up Plan: She was provided education on weight loss strategies; DEFERS DIETICIAN REFERRAL           Orders:       1101F  Pt screen for fall risk; document no falls in past year or only 1 fall w/o injury in past year (IVA)  (In-House)         3017F  Colorectal CA screen results documented and reviewed (PV)  (In-House)           Calculated BMI above the upper parameter and a follow-up plan was documented in the medical record  (In-House)           Negative EtOH screen  (In-House)            Post-menopausal HRT I will wean her off her estrogen and progesterone           Prescriptions:       Refill of: Citalopram Hydrobromide 20mg Tablet take 1  PO QHS  #90 (Ninety) tablet(s) Refills: 1       Refill of: Progesterone 100mg Capsules 1 qod for a month, then twice a week for a month then stop  #30 (Thirty) capsule(s) Refills: 0       Refill of: Crestor (Rosuvastatin) 10mg Tablet 1 tab daily  #90 (Ninety) tablet(s) Refills: 1       Refill of: Minivelle (Estradiol) 0.025mg Transdermal Patch USE ONE PATCH TWICE A WEEK FOR A MONTH THEN STOP  #8 (Eight) patch(es) Refills: 0          Screening mammogram - other           Orders:       92858  Screening mammography bi 2-view inc CAD  (Send-Out)            Screening for colorectal cancer needs colonoscopy in 2021           Orders:       72674  Hemoccult  (In-House)            Menopause will wean her off her hormones           Orders:       92625  DXA, bone density study, 1 or more sites; axial skeleton (eg hips, pelvis, spine)  (Send-Out)            Depression with anxiety  stable on meds          Mixed hyperlipidemia stable on meds             CHARGE CAPTURE:           Primary Diagnosis:     V72.31    Well Woman Exam                Z01.419    Encounter for gynecological examination (general) (routine) without abnormal findings                       Orders:          49670   Preventive medicine, established patient, age 40-64 years  (In-House)             76053   Urinalysis, automated, without microscopy  (In-House)             34988   Handlg&/or convey of spec for TR office to lab  (In-House)           V79.0 Screening for depression            Z13.89    Encounter for screening for other disorder              Orders:          66870 -25  Office/outpatient visit; established patient, level 4  (In-House)             1101F   Pt screen for fall risk; document no falls in past year or only 1 fall w/o injury in past year (IVA)  (In-House)             3017F   Colorectal CA screen results documented and reviewed (PV)  (In-House)                Calculated BMI above the upper parameter and a follow-up plan was documented in the medical record  (In-House)                Negative EtOH screen  (In-House)           V07.4 Post-menopausal HRT            Z79.890    Hormone replacement therapy (postmenopausal)    V76.12 Screening mammogram - other            Z12.31    Encounter for screening mammogram for malignant neoplasm of breast    V76.49 Screening for colorectal cancer            Z12.11    Encounter for screening for malignant neoplasm of colon              Orders:          41318   Hemoccult  (In-House)           627.2 Menopause            N95.1    Menopausal and female climacteric states    300.4 Depression with anxiety            F32.5    Major depressive disorder, single episode, in full remission    272.2 Mixed hyperlipidemia            E78.2    Mixed hyperlipidemia        ADDENDUMS:      ____________________________________    Addendum: 12/11/2018 03:17 PM - Yas Castillo         add under PE-Rectal exam performed-normal tone, no masses, hemoccult negative.hkm

## 2021-05-18 NOTE — PROGRESS NOTES
Senait Fong  1955     Office/Outpatient Visit    Visit Date: Thu, Nov 5, 2020 02:10 pm    Provider: Yas Castillo MD (Assistant: Hazel Livingston LPN)    Location: [facility]        Electronically signed by Yas Castillo MD on  11/09/2020 04:33:12 PM                             Subjective:        CC: has also been taking vitamin cmed refills    HPI:       Mrs. Fong is a 65F presenting to the office today for a check-up and medication refill.         She reports that she has been doing well over the last year and presents with no complaints today. She notes that she has been taking all medications regularly and is experiencing no ill effects of the medications. She notes that she and her  have been following a strict diet plan and that as a result of approx. 35lbs of weight loss she feels great.         She takes citalopram 20mg for depression. She reports that her mood is good at this time. She denies any sleep disturbances, changes in appetite, or anhedonia. She reports no SI/HI. She reports no anxiety at this time. Her PHQ-9 was 0 at today's visit.         Mrs. Fong takes Rosuvastatin for mixed hyperlipedemia. She reports good compliance with medication. She denies any chest pains, shortness of air, abdominal pain, N/V, or muscle aches. It appears that her hyperlipidemia is well-controlled on between her diet and the Crestor based on her most recent lab draw on 10/29/2020.             PHQ-9 Depression Screening: Completed form scanned and in chart; Total Score 0           Mixed hyperlipidemia details; current treatment includes Crestor,  a low cholesterol/low fat diet, and fish oil.  Compliance with treatment has been good; she takes her medication as directed, maintains her low cholesterol diet, follows up as directed, and maintains her exercise regimen.  She denies experiencing any hypercholesterolemia related symptoms.  Most recent lab tests include Glucose, Serum:  105  (mg/dL) (10/29/2020), Creatinine, Serum:  0.91 (mg/dl) (10/29/2020), Glom Filt Rate, Est:  >60 (ml/min/1.73m2) (10/29/2020), Alkaline Phosphatase, Serum:  57 (U/L) (10/29/2020), ALT (SGPT):  17 (U/L) (10/29/2020), AST (SGOT):  16 (U/L) (10/29/2020), Total Cholesterol:  134 (mg/dL) (10/29/2020), HDL:  54 (mg/dL) (10/29/2020), Triglycerides:  71 (mg/dL) (10/29/2020), LDL:  66 (mg/dL) (10/29/2020).      ROS:     CONSTITUTIONAL:  Positive for fatigue.   Negative for chills or fever.      E/N/T:  Negative for hearing problems, E/N/T pain, congestion, rhinorrhea, epistaxis, hoarseness, and dental problems.      CARDIOVASCULAR:  Negative for chest pain.      RESPIRATORY:  Negative for cough, dyspnea, and hemoptysis.      GASTROINTESTINAL:  Negative for abdominal pain, heartburn, constipation, diarrhea, and stool changes.      INTEGUMENTARY/BREAST:  Negative for rash.      NEUROLOGICAL:  Negative for dizziness, headaches, paresthesias, and weakness.      PSYCHIATRIC:  Negative for anxiety, depression, and sleep disturbances.          Past Medical History / Family History / Social History: Last eye exam was 2020 at [facility]. She reports that she required a new prescription but has been unable to fill it until getting back to [place] recently and is still waiting to receive it.         Last Reviewed on 2020 02:33 PM by Yas Castillo    Past Medical History:                 PAST MEDICAL HISTORY         Hyperlipidemia     Skin cancer: UNKNOWN;     depression         GYNECOLOGICAL HISTORY:     miscarriage 1    Menopause at age 51.          PREVENTIVE HEALTH MAINTENANCE             BONE DENSITY: was last done 12-10-18 with normal results     COLORECTAL CANCER SCREENING: colonoscopy with the following abnormalities noted-- Diverticulosis; 3/4/11     EYE EXAM: was last done      MAMMOGRAM: was last done 1/3/19 with normal results     PAP SMEAR: was last done  hx abnormal, biopsy normal         PAST  MEDICAL HISTORY             CURRENT MEDICAL PROVIDERS:    General Surgeon: (name unknown)    Ophthalmologist: Albert         Surgical History:     bunionectomy Jan 2015 L, Sept 2015 R     Uterine biopsy 2015         Cholecystectomy     breast bx nml; Procedures: colonoscopy 2011 Prior gynecologic procedures include cervical cryotherapy x2.          Family History:         Positive for Myocardial Infarction ( pat. GF ).      Positive for Colon Cancer ( mat. GM ).      Positive for COPD ( mat. GF ).      Positive for Crohn's Disease ( mother ).          Social History:     Occupation: Unemployed     Marital Status:      Children: 2 children     Exercise: Primary form of exercise is walking and gym at home.          Tobacco/Alcohol/Supplements:     Last Reviewed on 11/05/2020 02:15 PM by Hazel Livingston    Tobacco: She has a past history of cigarette smoking; quit date:  1997.      Caffeine:  She admits to consuming caffeine via soda ( 1 serving per day ).          Substance Abuse History:     Last Reviewed on 2/10/2017 09:10 AM by Cally Marcano    NEGATIVE         Mental Health History:     Last Reviewed on 2/10/2017 09:10 AM by Cally aMrcano        Communicable Diseases (eg STDs):     Last Reviewed on 2/10/2017 09:10 AM by Cally Marcano        Allergies:     Last Reviewed on 11/06/2019 01:45 PM by Ariadna Vidal    Codeine Sulfate:          Current Medications:     Last Reviewed on 11/06/2019 01:46 PM by Ariadna Vidal    Loratadine 10 mg oral tablet [1 tab daily]    Crestor 10 mg oral tablet [TAKE 1 TABLET DAILY]    citalopram 20 mg oral tablet [TAKE 1 TABLET AT BEDTIME]    cranberry     Multivitamin daily     melatonin 5 mg 2 po qhs     Fish Oil 360-1,200 mg oral capsule [Take 1 capsule(s) by mouth daily]    OTC calcium chew     gummy probiotic         Objective:        Vitals:         Current: 11/5/2020 2:18:44 PM    Ht:  5 ft, 1.75 in;  Wt: 151 lbs;  BMI: 27.8T: 98.8 F (temporal);  BP: 124/75 mm Hg (left  arm, sitting);  P: 62 bpm (left arm (BP Cuff), sitting);  sCr: 0.91 mg/dL;  GFR: 59.17        Exams:     PHYSICAL EXAM:     GENERAL: vital signs recorded - well developed, well nourished;  no apparent distress;     EYES: extraocular movements intact; conjunctiva and cornea are normal; PERRLA;     E/N/T:  normal EACs, TMs, nasal/oral mucosa, teeth, gingiva, and oropharynx;     NECK: range of motion is normal; thyroid is non-palpable;     RESPIRATORY: normal respiratory rate and pattern with no distress; normal breath sounds with no rales, rhonchi, wheezes or rubs;     CARDIOVASCULAR: normal rate; rhythm is regular;  no systolic murmur; no edema;     GASTROINTESTINAL: nontender; normal bowel sounds; no organomegaly;     MUSCULOSKELETAL:  Normal range of motion, strength and tone;     NEUROLOGICAL:  cranial nerves, motor and sensory function, reflexes, gait and coordination are all intact;     PSYCHIATRIC:  appropriate affect and demeanor; normal speech pattern; grossly normal memory;         Procedures:     Encounter for screening for depressionStates that her mood is good, denies any difficulties sleeping, appetite changes,         Encounter for immunization    1. Patient experienced no reaction.  Regarding contraindications to an Influenza vaccine:        No contraindications were noted.              Assessment:         Z13.31   Encounter for screening for depression       Z23   Encounter for immunization       E78.2   Mixed hyperlipidemia       F32.5   Major depressive disorder, single episode, in full remission       F51.01   Primary insomnia       J30.1   Allergic rhinitis due to pollen       Z12.31   Encounter for screening mammogram for malignant neoplasm of breast           ORDERS:         Radiology/Test Orders:       3017F  Colorectal CA screen results documented and reviewed (PV)  (In-House)            85912  Screening digital breast tomosynthesis bi  (Send-Out)              Procedures Ordered:       85823   Immunization administration (includes percutaneous, intradermal, subcutaneous or intramuscular injections); one vaccine (single or combination vaccine/toxoid)  (In-House)            73265  Fluzone High Dose  (In-House)              Other Orders:         Depression screen negative  (In-House)            1101F  Pt screen for fall risk; document no falls in past year or only 1 fall w/o injury in past year (IVA)  (In-House)              Screening mammogram results documented  (Send-Out)                      Plan:         Encounter for screening for depression    MIPS PHQ-9 Depression Screening: Completed form scanned and in chart; Total Score 0; Negative Depression Screen           Orders:         Depression screen negative  (In-House)            1101F  Pt screen for fall risk; document no falls in past year or only 1 fall w/o injury in past year (IVA)  (In-House)              Screening mammogram results documented  (Send-Out)            3017F  Colorectal CA screen results documented and reviewed (PV)  (In-House)              Encounter for immunization        IMMUNIZATIONS given today: Administration 1st Shot and Influenza HIGH Dose.            Immunizations:       50506  Immunization administration (includes percutaneous, intradermal, subcutaneous or intramuscular injections); one vaccine (single or combination vaccine/toxoid)  (In-House)            98070  Fluzone High Dose  (In-House)                Dose (ml): 0.7  Site: left deltoid  Route: intramuscular  Administered by: Hazel Livingston          : [employer]  Lot #: [id number]  Exp: 06/30/2021          NDC: [address]-30        Mixed hyperlipidemiastop crestor        Major depressive disorder, single episode, in full remissionwell controlled on meds        Primary insomniadoing well with melatonin        Allergic rhinitis due to pollenstable on meds        Encounter for screening mammogram for malignant neoplasm of breast          Orders:        54864  Screening digital breast tomosynthesis bi  (Send-Out)                  Charge Capture:         Primary Diagnosis:     Z13.31  Encounter for screening for depression           Orders:      63846  Office/outpatient visit; established patient, level 4  (In-House)              Depression screen negative  (In-House)            1101F  Pt screen for fall risk; document no falls in past year or only 1 fall w/o injury in past year (IVA)  (In-House)            3017F  Colorectal CA screen results documented and reviewed (PV)  (In-House)              Z23  Encounter for immunization           Orders:      29222  Immunization administration (includes percutaneous, intradermal, subcutaneous or intramuscular injections); one vaccine (single or combination vaccine/toxoid)  (In-House)            94929  Fluzone High Dose  (In-House)              E78.2  Mixed hyperlipidemia     F32.5  Major depressive disorder, single episode, in full remission     F51.01  Primary insomnia     J30.1  Allergic rhinitis due to pollen     Z12.31  Encounter for screening mammogram for malignant neoplasm of breast

## 2021-05-18 NOTE — PROGRESS NOTES
Senait Fong 1955     Office/Outpatient Visit    Visit Date: Wed, Apr 11, 2018 08:30 am    Provider: Yas Castillo MD (Assistant: Yue West MA)    Location: [facility]        Electronically signed by Yas Castillo MD on  04/11/2018 12:36:29 PM                             SUBJECTIVE:        CC:     Joanna is a 63 year old White female.  CHOL and ANXIETY follow up, s/p Fall;         HPI:     Joanna is overall doing great on her citalopram for her chronic anxiety.  No panic attacks or crying spells. No suicidal thoughts or sadness or depression.  She is sleeping well on melatonin.          Concerning mixed hyperlipidemia, current treatment includes Crestor,  a low cholesterol/low fat diet, and fish oil.  Compliance with treatment has been good; she takes her medication as directed, maintains her low cholesterol diet, follows up as directed, and maintains her exercise regimen.  She denies experiencing any hypercholesterolemia related symptoms.  Most recent lab tests include Glucose, Serum:  107 (mg/dL) (04/06/2018), Creatinine, Serum:  0.82 (mg/dl) (04/06/2018), Glom Filt Rate, Est:  >60 (ml/min/1.73m2) (04/06/2018), Alkaline Phosphatase, Serum:  43 (U/L) (04/06/2018), ALT (SGPT):  18 (U/L) (04/06/2018), AST (SGOT):  16 (U/L) (04/06/2018), Total Cholesterol:  178 (mg/dL) (04/06/2018), HDL:  59 (mg/dL) (04/06/2018), Triglycerides:  148 (mg/dL) (04/06/2018), LDL:  89 (mg/dL) (04/06/2018).      Joanna fell while packing and fell on her L shoulder and she is now having pain in her L shoulder, angely with abduction, pain is over her deltoid, moderate pain with movement, mild pain at rest.     ROS:     CONSTITUTIONAL:  Positive for fatigue and unintentional weight gain ( gradual; excessive caloric intake ).   Negative for chills or fever.      EYES:  Negative for blurred vision, eye pain, and photophobia.      E/N/T:  Negative for hearing problems, E/N/T pain, congestion, rhinorrhea, epistaxis,  hoarseness, and dental problems.      CARDIOVASCULAR:  Negative for chest pain.      RESPIRATORY:  Negative for cough, dyspnea, and hemoptysis.      GASTROINTESTINAL:  Negative for abdominal pain, heartburn, constipation, diarrhea, and stool changes.      NEUROLOGICAL:  Negative for dizziness, headaches, paresthesias, and weakness.          PMH/FMH/SH:     Last Reviewed on 2018 09:17 AM by Yas Castillo    Past Medical History:                 PAST MEDICAL HISTORY         Hyperlipidemia     depression         GYNECOLOGICAL HISTORY:     miscarriage 1    Menopause at age 51.          PREVENTIVE HEALTH MAINTENANCE             COLONOSCOPY: Done within the last 10 years was last done      MAMMOGRAM: was last done  with normal results     BONE DENSITY: was last done  with normal results     PAP SMEAR: was last done  hx abnormal, biopsy normal     INFLUENZA VACCINE: declines, understands reason for immunization         Surgical History:     bunionectomy 2015 L, 2015 R     Uterine biopsy          Cholecystectomy      breast bx nml; Procedures: colonoscopy  Prior gynecologic procedures include cervical cryotherapy x2.          Family History:         Positive for Myocardial Infarction ( pat. GF ).      Positive for Colon Cancer ( mat. GM ).      Positive for COPD ( mat. GF ).      Positive for Crohn's Disease ( mother ).          Social History:     Occupation: Unemployed     Marital Status:      Children: 2 children     Exercise: Primary form of exercise is walking and gym at home.          Tobacco/Alcohol/Supplements:     Last Reviewed on 2018 09:17 AM by Yas Castillo    Tobacco: She has a past history of cigarette smoking; quit date:  .      Caffeine:  She admits to consuming caffeine via soda ( 1 serving per day ).          Substance Abuse History:     Last Reviewed on 2/10/2017 09:10 AM by Cally Marcano    NEGATIVE         Mental Health History:      Last Reviewed on 2/10/2017 09:10 AM by Cally Marcano        Communicable Diseases (eg STDs):     Last Reviewed on 2/10/2017 09:10 AM by Cally Marcano            Allergies:     Last Reviewed on 2/10/2017 09:10 AM by Cally Marcano    Codeine Sulfate:        Current Medications:     Last Reviewed on 2/10/2017 09:10 AM by Cally Marcano    Citalopram Hydrobromide 20mg Tablet take 1  PO QHS     Crestor 20mg Tablet Take 1 tablet(s) by mouth daily     Progesterone 100mg Capsules 1 capsule daily     Fish Oil 1,200mg Softgel capsule Take 1 capsule(s) by mouth daily     protandim, probiotic and fat burner     Loratadine 10mg Tablet 1 tab daily     melatonin 5 mg 2 po qhs     viactive 1000mg 1 po daily     Multivitamin daily     Minivelle 0.0375mg Transdermal Patch Apply 1 patch(es) to lower abdomen 2 times weekly     cranberry         OBJECTIVE:        Vitals:         Current: 4/11/2018 8:32:44 AM    Ht:  5 ft, 1.75 in;  Wt: 185.7 lbs;  BMI: 34.2    T: 97.9 F (oral);  BP: 132/76 mm Hg (left arm, sitting);  P: 62 bpm (left arm (BP Cuff), sitting);  sCr: 0.82 mg/dL;  GFR: 73.58        Exams:     PHYSICAL EXAM:     GENERAL: vital signs recorded - well developed, well nourished,  moderately obese;  no apparent distress;     EYES:  EOMI; PERRLA; normal lids, conjunctiva, and fundoscopic exam;     E/N/T: OROPHARYNX:  normal mucosa, dentition, gingiva, and posterior pharynx;     NECK: range of motion is normal; thyroid is non-palpable;     RESPIRATORY: normal respiratory rate and pattern with no distress; normal breath sounds with no rales, rhonchi, wheezes or rubs;     CARDIOVASCULAR: normal rate; rhythm is regular;  no systolic murmur; no edema;     GASTROINTESTINAL: nontender, nondistended; no hepatosplenomegaly or masses; no bruits;     MUSCULOSKELETAL: normal gait; R shoulder-FROM, pain with abduction, pain over deltoid, SITS intact except positive empty can test and pain with abduction     NEUROLOGIC: mental status: alert and  oriented x 3;     PSYCHIATRIC:  appropriate affect and demeanor; normal speech pattern; grossly normal memory;         ASSESSMENT:           300.02  Generalized anxiety disorder              DDx:     272.2  Mixed hyperlipidemia              DDx:     719.41   M25.512  Shoulder pain              DDx:     V07.4  Post-menopausal HRT              DDx:     V76.49   Z12.11  Screening for colorectal cancer              DDx:         ORDERS:         Meds Prescribed:       Refill of: Citalopram Hydrobromide 20mg Tablet take 1  PO QHS  #90 (Ninety) tablet(s) Refills: 1       Refill of: Crestor (Rosuvastatin) 10mg Tablet 1 tab daily  #90 (Ninety) tablet(s) Refills: 1       Diclofenac Sodium 75mg Tablets, Enteric Coated 1 tab bid with food  #40 (Forty) tablet(s) Refills: 0         Radiology/Test Orders:       [id number]  Left Radiologic exam, shoulder; comp, 2 views  (Send-Out)         3017F  Colorectal CA screen results documented and reviewed (PV)  (In-House)           Procedures Ordered:       REFER  Referral to Specialist or Other Facility  (Send-Out)                   PLAN:          Generalized anxiety disorder stable on meds          Mixed hyperlipidemia stable on meds, meds refilled           Prescriptions:       Refill of: Citalopram Hydrobromide 20mg Tablet take 1  PO QHS  #90 (Ninety) tablet(s) Refills: 1       Refill of: Crestor (Rosuvastatin) 10mg Tablet 1 tab daily  #90 (Ninety) tablet(s) Refills: 1          Shoulder pain supraspinatus tear suspected, mild OA on xray shoulder, will start her on diclofenac and refer her for PT         RADIOLOGY:  I have ordered Shoulder x-ray: left shoulder to be done today.      REFERRALS:  Referral initiated to physical therapy ( [facility] ).            Prescriptions:       Diclofenac Sodium 75mg Tablets, Enteric Coated 1 tab bid with food  #40 (Forty) tablet(s) Refills: 0           Orders:       [id number]  Left Radiologic exam, shoulder; comp, 2 views   (Send-Out)         REFER  Referral to Specialist or Other Facility  (Send-Out)            Post-menopausal HRT stable on progresterone and estrogen patch, she sees gynecologist          Screening for colorectal cancer she is UTD on her WWE and mammogram     MIPS     COLORECTAL CANCER SCREENING: Results are in chart           Orders:       3017F  Colorectal CA screen results documented and reviewed (PV)  (In-House)               CHARGE CAPTURE:          **Please note: ICD descriptions below are intended for billing purposes only and may not represent clinical diagnoses**        Primary Diagnosis:         300.02 Generalized anxiety disorder            F41.1    Generalized anxiety disorder              Orders:          11971   Office/outpatient visit; established patient, level 4  (In-House)           272.2 Mixed hyperlipidemia            E78.2    Mixed hyperlipidemia    719.41 Shoulder pain            M25.512    Pain in left shoulder    V07.4 Post-menopausal HRT            Z79.890    Hormone replacement therapy (postmenopausal)    V76.49 Screening for colorectal cancer            Z12.11    Encounter for screening for malignant neoplasm of colon              Orders:          3017F   Colorectal CA screen results documented and reviewed (PV)  (In-House)

## 2021-05-18 NOTE — PROGRESS NOTES
Senait Fong 1955     Office/Outpatient Visit    Visit Date: Wed, Nov 6, 2019 01:45 pm    Provider: Yas Castillo MD (Assistant: Ariadna Vidal MA)    Location: [facility]        Electronically signed by Yas Castillo MD on  11/06/2019 04:18:56 PM                             SUBJECTIVE:        CC:     Joanna is a 64 year old White female.  MEDICARE WELLNESS;         HPI:         Joanna is here for a Medicare wellness visit.  ADVANCE DIRECTIVES (Please update in PMH): Living will requested Returning to health checkup, the required HRA questions are integrated within this visit note. Family medical history and individual medical/surgical history were reviewed and updated.  A current height, weight, BMI, blood pressure, and pulse were recorded in the vitals section of the note and have been reviewed. Patient's medications, including supplements, were recorded in the chart and reviewed.  Current providers and suppliers were reviewed and updated.          Self-Assessment of Health: She rates her health as good. She rates her confidence of being able to control/manage most of her health problems as very confident. Her physical/emotional health has limited her social activites not at all.  A review of cognitive impairment was performed, including ability to drive a car, manage finances, and any memory changes, and was found to be negative.  A review of functional ability, including bathing, dressing, walking, and urine/bowel continence as well as level of safety was performed and was found to be negative.  Falls Risk: Has not had any falls or only one fall without injury in the past year.  In regard to hearing, she reports having trouble hearing the TV/radio when others do not and having to strain to hear or understand conversations, but not wearing hearing aid(s).  Concerning home safety, she reports that at home she DOES have adequate lighting, a skid resistant shower/tub, grab bars in the  bath, handrails on stairs and functioning smoke alarms, but not absence of throw rugs.  Physical Activity: She exercises for at least 20 minutes 3 or more days/week.; Type of diet patient normally eats is described as poor--needs improvement.  Tobacco: She has a past history of cigarette smoking; quit date:  1997.  Preventative Health updated today Her last gynecological exam was one year ago.  Her last Pap smear was in 2013.  Her last mammogram was 1 year ago.  DEXA scan on over two years ago and was normal..  She performs breast self-exams occasionally.  She is not currently using any form of contraception.  She is current with her Td.  She is not current with influenza immunization.  COLONOSCOPY NO POLYPS 2011 DR HUMPHREY         PHQ-9 Depression Screening: Completed form scanned and in chart; Total Score 0         With regard to the mixed hyperlipidemia, current treatment includes Crestor,  a low cholesterol/low fat diet, and fish oil.  Compliance with treatment has been good; she takes her medication as directed, maintains her low cholesterol diet, follows up as directed, and maintains her exercise regimen.  She denies experiencing any hypercholesterolemia related symptoms.  Most recent lab tests include Creatinine, Serum:  0.82 (mg/dl) (10/17/2019), Glom Filt Rate, Est:  >60 (ml/min/1.73m2) (10/17/2019), Alkaline Phosphatase, Serum:  59 (U/L) (10/17/2019), ALT (SGPT):  34 (U/L) (10/17/2019), AST (SGOT):  25 (U/L) (10/17/2019), Total Cholesterol:  187 (mg/dL) (10/17/2019), HDL:  55 (mg/dL) (10/17/2019), Triglycerides:  195 (mg/dL) (10/17/2019), LDL:  93 (mg/dL) (10/17/2019).      ROS:     CONSTITUTIONAL:  Positive for fatigue.   Negative for chills or fever.      E/N/T:  Negative for hearing problems, E/N/T pain, congestion, rhinorrhea, epistaxis, hoarseness, and dental problems.      CARDIOVASCULAR:  Negative for chest pain.      RESPIRATORY:  Negative for cough, dyspnea, and hemoptysis.      GASTROINTESTINAL:   Negative for abdominal pain, heartburn, constipation, diarrhea, and stool changes.      INTEGUMENTARY/BREAST:  Negative for rash.      NEUROLOGICAL:  Negative for dizziness, headaches, paresthesias, and weakness.      PSYCHIATRIC:  Negative for anxiety, depression, and sleep disturbances.          PMH/FMH/SH:     Last Reviewed on 2019 02:23 PM by Yas Castillo    Past Medical History:                 PAST MEDICAL HISTORY         Hyperlipidemia     Skin cancer: UNKNOWN;     depression         GYNECOLOGICAL HISTORY:     miscarriage 1    Menopause at age 51.          PREVENTIVE HEALTH MAINTENANCE             BONE DENSITY: was last done 12-10-18 with normal results     COLORECTAL CANCER SCREENING: colonoscopy with the following abnormalities noted-- Diverticulosis; 3/4/11     EYE EXAM: was last done      MAMMOGRAM: was last done 1/3/19 with normal results     PAP SMEAR: was last done  hx abnormal, biopsy normal         PAST MEDICAL HISTORY             CURRENT MEDICAL PROVIDERS:    General Surgeon: (name unknown)    Ophthalmologist: Albert         Surgical History:     bunionectomy 2015 L, 2015 R     Uterine biopsy          Cholecystectomy      breast bx nml; Procedures: colonoscopy  Prior gynecologic procedures include cervical cryotherapy x2.          Family History:         Positive for Myocardial Infarction ( pat. GF ).      Positive for Colon Cancer ( mat. GM ).      Positive for COPD ( mat. GF ).      Positive for Crohn's Disease ( mother ).          Social History:     Occupation: Unemployed     Marital Status:      Children: 2 children     Exercise: Primary form of exercise is walking and gym at home.          Tobacco/Alcohol/Supplements:     Last Reviewed on 2019 02:23 PM by Yas Castillo    Tobacco: She has a past history of cigarette smoking; quit date:  .      Caffeine:  She admits to consuming caffeine via soda ( 1 serving per day ).           Substance Abuse History:     Last Reviewed on 2/10/2017 09:10 AM by Cally Marcano    NEGATIVE         Mental Health History:     Last Reviewed on 2/10/2017 09:10 AM by Cally Marcano        Communicable Diseases (eg STDs):     Last Reviewed on 2/10/2017 09:10 AM by Cally Marcano            Immunizations:     None        Allergies:     Last Reviewed on 12/06/2018 10:59 AM by Hoa Parsons    Codeine Sulfate:        Current Medications:     Last Reviewed on 12/06/2018 10:59 AM by Hoa Parsons    Citalopram Hydrobromide 20mg Tablet take 1  PO QHS     Crestor 10mg Tablet 1 tab daily     Fish Oil 1,200mg Softgel capsule Take 1 capsule(s) by mouth daily     Loratadine 10mg Tablet 1 tab daily     melatonin 5 mg 2 po qhs     viactive 1000mg 1 po daily     Multivitamin daily     cranberry         OBJECTIVE:        Vitals:         Current: 11/6/2019 1:48:41 PM    Ht:  5 ft, 1.75 in;  Wt: 183.8 lbs;  BMI: 33.9    T: 98 F (oral);  BP: 148/73 mm Hg (right arm, sitting);  P: 63 bpm (right arm (BP Cuff), sitting);  sCr: 0.82 mg/dL;  GFR: 72.33    VA: 20/30 OD, 20/25 OS        Repeat:     1:59:42 PM     BP:   131/66mm Hg (left arm, sitting, hr 58)         Exams:     PHYSICAL EXAM:     GENERAL: vital signs recorded - well developed, well nourished;  no apparent distress;     EYES: extraocular movements intact; conjunctiva and cornea are normal; PERRLA;     E/N/T:  normal EACs, TMs, nasal/oral mucosa, teeth, gingiva, and oropharynx;     NECK: range of motion is normal; thyroid is non-palpable;     RESPIRATORY: normal respiratory rate and pattern with no distress; normal breath sounds with no rales, rhonchi, wheezes or rubs;     CARDIOVASCULAR: normal rate; rhythm is regular;  no systolic murmur; no edema;     GASTROINTESTINAL: nontender; normal bowel sounds; no organomegaly;     BREAST/INTEGUMENT: BREASTS: breast exam is normal without masses, skin changes, or nipple discharge;     MUSCULOSKELETAL:  Normal range of motion,  strength and tone;     NEUROLOGICAL:  cranial nerves, motor and sensory function, reflexes, gait and coordination are all intact;     PSYCHIATRIC:  appropriate affect and demeanor; normal speech pattern; grossly normal memory;         Lab/Test Results:             Glucose, Urine:  Neg (11/06/2019),     Bilirubin, urine:  Negative (11/06/2019),     Ketones, Urine Strip:  Negative (11/06/2019),     Specific Gravity, urine:  1.015 (11/06/2019),     Blood in Urine:  negative (11/06/2019),     pH, urine:  5.0 (11/06/2019),     Protein Urine QL:  negative (11/06/2019),     Urobilinogen, urine:  0.2 E.U./dL (11/06/2019),     Nitrite, Urine:  Negative (11/06/2019),     Leukoctyes, urine:  Small (11/06/2019),     Appearance:  Clear (11/06/2019),     collection source:  Clean-catch (11/06/2019),     Color:  Yellow (11/06/2019),     Performed by::  AS (11/06/2019),             ASSESSMENT           V70.0   Z00.00  Health checkup              DDx:     V79.0   Z13.89  Screening for depression              DDx:     553.3   K44.9  Hiatal hernia              DDx:     272.2   E78.2  Mixed hyperlipidemia              DDx:     300.4   F32.5  Depression with anxiety              DDx:     437.7   G45.4  Transient global amnesia              DDx:     V76.12   Z12.31  Screening mammogram - other              DDx:         ORDERS:         Meds Prescribed:       Refill of: Citalopram Hydrobromide 20mg Tablet take 1  PO QHS  #90 (Ninety) tablet(s) Refills: 1       Refill of: Crestor (Rosuvastatin) 10mg Tablet 1 tab daily  #90 (Ninety) tablet(s) Refills: 1         Radiology/Test Orders:       3014F  Screening mammography results documented and reviewed (PV)1  (In-House)         3017F  Colorectal CA screen results documented and reviewed (PV)  (In-House)         24444  Screening mammography bi 2-view inc CAD  (Send-Out)           Other Orders:         Welcome to Medicare  (In-House)           Depression screen negative  (In-House)          1101F  Pt screen for fall risk; document no falls in past year or only 1 fall w/o injury in past year (IVA)  (In-House)                   PLAN:          Health checkup MEDICARE WELLNESS EXAM-SHE IS UTD ON HER MAMMOGRAM, DEXA, COLONOSCOPY, due for  PAP AND PELVIC EXAM, SHE DEFERS VACCINATIONS INCLUDING:  SHINGLES VACCINE/PREVNAR/ PNEUMOVAX/FLU VACCINE, NO FALL RISK, NO MEMORY ISSUES, NO SIGNS/SYMPTOMS OF DEPRESSION, SHE LIVES WITH HER , SHE IS ABLE TO DRIVE AND PERFORM ADL'S/FINANCES INDEPENDENTLY, HEARING IS ADEQUATE-PT DEFERS SCREENING TEST, SHE HAS A LIVING WILL AND A PREV SERVICES HANDOUT AND SAFETY HANDOUT WERE GIVEN TO HER.  CURRENT DOCTOR LIST UPDATED.  RECOMMEND YEARLY EYE EXAMS AND DIETARY REFERAL FOR WT LOSS (PT DEFERS)           Prescriptions:       Refill of: Citalopram Hydrobromide 20mg Tablet take 1  PO QHS  #90 (Ninety) tablet(s) Refills: 1       Refill of: Crestor (Rosuvastatin) 10mg Tablet 1 tab daily  #90 (Ninety) tablet(s) Refills: 1           Orders:         Welcome to Medicare  (In-House)            Screening for depression     MIPS PHQ-9 Depression Screening: Completed form scanned and in chart; Total Score 0; Negative Depression Screen           Orders:         Depression screen negative  (In-House)         1101F  Pt screen for fall risk; document no falls in past year or only 1 fall w/o injury in past year (IVA)  (In-House)         3014F  Screening mammography results documented and reviewed (PV)1  (In-House)         3017F  Colorectal CA screen results documented and reviewed (PV)  (In-House)            Hiatal hernia stable          Mixed hyperlipidemia well controlled, cont low carb diet and limit ETOH use          Depression with anxiety well controlled on citalopram          Transient global amnesia happened while at high altitude, symptoms lasted 6 hours, she is now on aspirin          Screening mammogram - other           Orders:       04927  Screening mammography bi  2-view inc CAD  (Send-Out)               Other Orders:       01143  Preventive medicine, established patient, age 65+ years  (In-House)           CHARGE CAPTURE           **Please note: ICD descriptions below are intended for billing purposes only and may not represent clinical diagnoses**        Primary Diagnosis:         V70.0 Health checkup            Z00.00    Encounter for general adult medical examination without abnormal findings              Orders:             Welcome to Medicare  (In-House)           V79.0 Screening for depression            Z13.89    Encounter for screening for other disorder              Orders:          21568 -25  Office/outpatient visit; established patient, level 4  (In-House)                Depression screen negative  (In-House)             1101F   Pt screen for fall risk; document no falls in past year or only 1 fall w/o injury in past year (IVA)  (In-House)             3014F   Screening mammography results documented and reviewed (PV)1  (In-House)             3017F   Colorectal CA screen results documented and reviewed (PV)  (In-House)           553.3 Hiatal hernia            K44.9    Diaphragmatic hernia without obstruction or gangrene    272.2 Mixed hyperlipidemia            E78.2    Mixed hyperlipidemia    300.4 Depression with anxiety            F32.5    Major depressive disorder, single episode, in full remission    437.7 Transient global amnesia            G45.4    Transient global amnesia    V76.12 Screening mammogram - other            Z12.31    Encounter for screening mammogram for malignant neoplasm of breast        Other Orders:           [id number]   Preventive medicine, established patient, age 65+ years  (In-House)

## 2021-06-25 RX ORDER — MULTIPLE VITAMINS W/ MINERALS TAB 9MG-400MCG
1 TAB ORAL DAILY
COMMUNITY

## 2021-06-25 RX ORDER — CITALOPRAM 20 MG/1
TABLET ORAL
COMMUNITY
Start: 2021-03-19 | End: 2021-06-25 | Stop reason: SDUPTHER

## 2021-06-25 RX ORDER — CITALOPRAM 20 MG/1
TABLET ORAL
Qty: 90 TABLET | Refills: 3 | Status: SHIPPED | OUTPATIENT
Start: 2021-06-25 | End: 2022-06-16 | Stop reason: SDUPTHER

## 2021-06-25 RX ORDER — LORATADINE 10 MG/1
10 TABLET ORAL DAILY
COMMUNITY

## 2021-07-01 VITALS
SYSTOLIC BLOOD PRESSURE: 132 MMHG | WEIGHT: 185.7 LBS | DIASTOLIC BLOOD PRESSURE: 76 MMHG | HEIGHT: 62 IN | HEART RATE: 62 BPM | TEMPERATURE: 97.9 F | BODY MASS INDEX: 34.17 KG/M2

## 2021-07-01 VITALS
WEIGHT: 190.4 LBS | HEIGHT: 62 IN | TEMPERATURE: 98.6 F | SYSTOLIC BLOOD PRESSURE: 132 MMHG | DIASTOLIC BLOOD PRESSURE: 86 MMHG | HEART RATE: 57 BPM | BODY MASS INDEX: 35.04 KG/M2

## 2021-07-01 VITALS
HEART RATE: 63 BPM | WEIGHT: 183.8 LBS | SYSTOLIC BLOOD PRESSURE: 131 MMHG | BODY MASS INDEX: 33.82 KG/M2 | HEIGHT: 62 IN | TEMPERATURE: 98 F | DIASTOLIC BLOOD PRESSURE: 66 MMHG

## 2021-07-02 VITALS
HEIGHT: 63 IN | TEMPERATURE: 98 F | SYSTOLIC BLOOD PRESSURE: 121 MMHG | RESPIRATION RATE: 16 BRPM | DIASTOLIC BLOOD PRESSURE: 73 MMHG | BODY MASS INDEX: 26.22 KG/M2 | WEIGHT: 148 LBS

## 2021-07-02 VITALS
HEIGHT: 62 IN | TEMPERATURE: 98.8 F | HEART RATE: 62 BPM | SYSTOLIC BLOOD PRESSURE: 124 MMHG | DIASTOLIC BLOOD PRESSURE: 75 MMHG | BODY MASS INDEX: 27.79 KG/M2 | WEIGHT: 151 LBS

## 2021-07-02 VITALS
SYSTOLIC BLOOD PRESSURE: 127 MMHG | WEIGHT: 153.4 LBS | HEART RATE: 54 BPM | HEIGHT: 62 IN | DIASTOLIC BLOOD PRESSURE: 68 MMHG | BODY MASS INDEX: 28.23 KG/M2 | TEMPERATURE: 97.3 F

## 2022-02-16 DIAGNOSIS — Z12.31 SCREENING MAMMOGRAM FOR BREAST CANCER: Primary | ICD-10-CM

## 2022-05-31 RX ORDER — CITALOPRAM 20 MG/1
TABLET ORAL
Qty: 90 TABLET | Refills: 3 | OUTPATIENT
Start: 2022-05-31

## 2022-06-16 DIAGNOSIS — F33.0 MAJOR DEPRESSIVE DISORDER, RECURRENT, MILD: Primary | ICD-10-CM

## 2022-06-16 NOTE — TELEPHONE ENCOUNTER
Pt called needing a refill on her Celexa.     She is not in the state at this time, as they live in their RV.     Pt hasn't been seen in over a year. April, 2021.     Scheduled pt a med refill appt for DOXIMITY for next first available, which isn't until 8/9/2022 @ 1015. Pt stated they would be back in [place] in October and she would come in for an in person visit.     She does still have about 2 weeks worth of medication, so ok for refill to wait, until pcp returns.     Didn't feel comfortable approving medication with her not being seen in over a year.     Rx Refill Note  Requested Prescriptions     Pending Prescriptions Disp Refills   • citalopram (CeleXA) 20 MG tablet 90 tablet 0     Sig: Take 1 tablet by mouth every night at bedtime.      Last office visit with prescribing clinician: April, 2021  Next office visit with prescribing clinician: 8/9/2022     Is Refill Pharmacy correct?- yes  Hazel Livingston LPN  06/16/22, 16:21 EDT  
Detail Level: Zone

## 2022-06-19 RX ORDER — CITALOPRAM 20 MG/1
20 TABLET ORAL
Qty: 90 TABLET | Refills: 0 | Status: SHIPPED | OUTPATIENT
Start: 2022-06-19 | End: 2022-08-09 | Stop reason: SDUPTHER

## 2022-08-09 ENCOUNTER — OFFICE VISIT (OUTPATIENT)
Dept: FAMILY MEDICINE CLINIC | Age: 67
End: 2022-08-09

## 2022-08-09 VITALS — WEIGHT: 161 LBS | BODY MASS INDEX: 29.69 KG/M2

## 2022-08-09 DIAGNOSIS — J30.9 ALLERGIC RHINITIS, UNSPECIFIED SEASONALITY, UNSPECIFIED TRIGGER: ICD-10-CM

## 2022-08-09 DIAGNOSIS — Z13.6 ENCOUNTER FOR SCREENING FOR CARDIOVASCULAR DISORDERS: ICD-10-CM

## 2022-08-09 DIAGNOSIS — Z00.00 ENCOUNTER FOR ANNUAL WELLNESS EXAM IN MEDICARE PATIENT: Primary | ICD-10-CM

## 2022-08-09 DIAGNOSIS — L30.9 ECZEMA, UNSPECIFIED TYPE: ICD-10-CM

## 2022-08-09 DIAGNOSIS — Z23 ENCOUNTER FOR IMMUNIZATION: ICD-10-CM

## 2022-08-09 DIAGNOSIS — Z12.11 COLON CANCER SCREENING: ICD-10-CM

## 2022-08-09 DIAGNOSIS — Z12.31 ENCOUNTER FOR SCREENING MAMMOGRAM FOR BREAST CANCER: ICD-10-CM

## 2022-08-09 DIAGNOSIS — F33.0 MAJOR DEPRESSIVE DISORDER, RECURRENT, MILD: ICD-10-CM

## 2022-08-09 DIAGNOSIS — Z78.0 POSTMENOPAUSAL STATE: ICD-10-CM

## 2022-08-09 DIAGNOSIS — Z11.59 ENCOUNTER FOR SCREENING FOR OTHER VIRAL DISEASES: ICD-10-CM

## 2022-08-09 PROCEDURE — G0439 PPPS, SUBSEQ VISIT: HCPCS | Performed by: FAMILY MEDICINE

## 2022-08-09 PROCEDURE — 1170F FXNL STATUS ASSESSED: CPT | Performed by: FAMILY MEDICINE

## 2022-08-09 PROCEDURE — 1159F MED LIST DOCD IN RCRD: CPT | Performed by: FAMILY MEDICINE

## 2022-08-09 PROCEDURE — 99213 OFFICE O/P EST LOW 20 MIN: CPT | Performed by: FAMILY MEDICINE

## 2022-08-09 RX ORDER — CITALOPRAM 20 MG/1
20 TABLET ORAL
Qty: 90 TABLET | Refills: 1 | Status: SHIPPED | OUTPATIENT
Start: 2022-08-09 | End: 2023-03-01

## 2022-08-09 NOTE — ASSESSMENT & PLAN NOTE
MEDICARE WELLNESS EXAM-SHE IS due for her MAMMOGRAM NOW, UTD ON DEXA, UTD ON COLONOSCOPY, UTD ON PAP AND PELVIC EXAM, SHE DEFERS VACCINATIONS INCLUDING:  SHINGLES VACCINE/PREVNAR 20/FLU AND COVID BOOSTER VACCINE, NO FALL RISK, NO MEMORY ISSUES, ANXIETY/ DEPRESSION ARE UNDER GOOD CONTROL, SHE LIVES WITH HER , SHE IS ABLE TO DRIVE AND PERFORM ADL'S/FINANCES INDEPENDENTLY, HEARING IS DECREASED-WILL CONSIDER TESTING WHEN SHE GETS BACK TO [place], COUNSELED ON LIVING WILL, CURRENT DOCTOR LIST UPDATED.  RECOMMEND YEARLY EYE EXAMS (OVERDUE) AND DENTAL EXAMS EVERY 6 MONTHS, RECOMMEND DIETICIAN REFERRAL FOR DIET COUNSELING WHEN SHE GETS BACK TO [place]   ADVANCED DIRECTIVES: None

## 2022-08-19 ENCOUNTER — TELEPHONE (OUTPATIENT)
Dept: FAMILY MEDICINE CLINIC | Age: 67
End: 2022-08-19

## 2022-11-15 ENCOUNTER — HOSPITAL ENCOUNTER (OUTPATIENT)
Dept: MAMMOGRAPHY | Facility: HOSPITAL | Age: 67
Discharge: HOME | End: 2022-11-15
Admitting: FAMILY MEDICINE

## 2022-11-15 DIAGNOSIS — Z12.31 ENCOUNTER FOR SCREENING MAMMOGRAM FOR BREAST CANCER: ICD-10-CM

## 2022-11-15 PROCEDURE — 77067 SCR MAMMO BI INCL CAD: CPT

## 2022-11-15 PROCEDURE — 77063 BREAST TOMOSYNTHESIS BI: CPT

## 2022-11-21 ENCOUNTER — OFFICE VISIT (OUTPATIENT)
Dept: FAMILY MEDICINE CLINIC | Age: 67
End: 2022-11-21

## 2022-11-21 VITALS
BODY MASS INDEX: 29.81 KG/M2 | OXYGEN SATURATION: 99 % | HEART RATE: 54 BPM | SYSTOLIC BLOOD PRESSURE: 127 MMHG | DIASTOLIC BLOOD PRESSURE: 57 MMHG | HEIGHT: 62 IN | WEIGHT: 162 LBS

## 2022-11-21 DIAGNOSIS — R01.1 MURMUR, CARDIAC: ICD-10-CM

## 2022-11-21 DIAGNOSIS — R53.83 OTHER FATIGUE: ICD-10-CM

## 2022-11-21 DIAGNOSIS — M25.50 ARTHRALGIA, UNSPECIFIED JOINT: ICD-10-CM

## 2022-11-21 DIAGNOSIS — F33.0 MAJOR DEPRESSIVE DISORDER, RECURRENT, MILD: ICD-10-CM

## 2022-11-21 DIAGNOSIS — M79.10 MYALGIA: Primary | ICD-10-CM

## 2022-11-21 DIAGNOSIS — J30.9 ALLERGIC RHINITIS, UNSPECIFIED SEASONALITY, UNSPECIFIED TRIGGER: ICD-10-CM

## 2022-11-21 DIAGNOSIS — Z12.11 COLON CANCER SCREENING: ICD-10-CM

## 2022-11-21 DIAGNOSIS — Z13.6 ENCOUNTER FOR SCREENING FOR CARDIOVASCULAR DISORDERS: ICD-10-CM

## 2022-11-21 PROCEDURE — 99214 OFFICE O/P EST MOD 30 MIN: CPT | Performed by: FAMILY MEDICINE

## 2022-11-21 NOTE — PROGRESS NOTES
Senait Fong presents to [facility].    Chief Complaint: generalized weakness    Subjective       History of Present Illness:  HPI   Muscle pain, onset a couple years ago, acutely worse, angely with her quitting her job.  Pain is the worst at night lying down. It is in her arms, buttocks-radicular down both legs, neck and upper back pain, hands B.  She has had 3 falls in past year-due to tripping over objects, but she went down hard every time.  Weakness noted in  strength. We stopped her cholesterol medication last OV.  Her weight is up.     She has eczema and her skin is worse with the eczema of her face and hands.  She does see dermatology and has an upcoming appt    She is on celexa for depression and she is stable and well controlled.     Chronic allergies stable on loratadine        Review of Systems:  Review of Systems   Constitutional: Negative for chills, fatigue and fever.   HENT: Negative for ear pain, sinus pressure and sore throat.    Eyes: Negative for blurred vision and double vision.   Respiratory: Negative for cough, shortness of breath and wheezing.    Cardiovascular: Negative for chest pain and palpitations.   Gastrointestinal: Negative for abdominal pain, blood in stool, constipation, diarrhea, nausea and vomiting.   Skin: Negative for rash.   Neurological: Negative for dizziness and headache.   Psychiatric/Behavioral: Negative for depressed mood.        Objective   Medical History:  No past medical history on file.  No past surgical history on file.   History reviewed. No pertinent family history.  Social History     Tobacco Use   • Smoking status: Never   • Smokeless tobacco: Never   Substance Use Topics   • Alcohol use: Yes       Health Maintenance Due   Topic Date Due   • COLORECTAL CANCER SCREENING  Never done   • HEPATITIS C SCREENING  Never done          There is no immunization history on file for this patient.    Allergies   Allergen Reactions   •  "Codeine Nausea Only        Medications:  Current Outpatient Medications on File Prior to Visit   Medication Sig   • citalopram (CeleXA) 20 MG tablet Take 1 tablet by mouth every night at bedtime.   • loratadine (CLARITIN) 10 MG tablet Take 10 mg by mouth Daily.   • multivitamin with minerals tablet tablet Take 1 tablet by mouth Daily.     No current facility-administered medications on file prior to visit.       Vital Signs:   /57 (BP Location: Left arm, Patient Position: Sitting, Cuff Size: Adult)   Pulse 54   Ht 156.8 cm (61.75\")   Wt 73.5 kg (162 lb)   SpO2 99%   BMI 29.87 kg/m²       Physical Exam:  Physical Exam  Vitals and nursing note reviewed.   Constitutional:       General: She is not in acute distress.     Appearance: Normal appearance. She is not ill-appearing, toxic-appearing or diaphoretic.   HENT:      Head: Normocephalic and atraumatic.      Right Ear: Tympanic membrane, ear canal and external ear normal.      Left Ear: Tympanic membrane, ear canal and external ear normal.      Nose: No congestion or rhinorrhea.      Mouth/Throat:      Mouth: Mucous membranes are moist.      Pharynx: Oropharynx is clear. No oropharyngeal exudate or posterior oropharyngeal erythema.   Eyes:      Extraocular Movements: Extraocular movements intact.      Conjunctiva/sclera: Conjunctivae normal.      Pupils: Pupils are equal, round, and reactive to light.   Cardiovascular:      Rate and Rhythm: Normal rate and regular rhythm.      Heart sounds: Murmur heard.    Systolic murmur is present with a grade of 2/6.  Pulmonary:      Effort: Pulmonary effort is normal.      Breath sounds: Normal breath sounds. No wheezing, rhonchi or rales.   Abdominal:      General: Abdomen is flat.      Palpations: Abdomen is soft.   Musculoskeletal:      Cervical back: Neck supple. No rigidity.   Lymphadenopathy:      Cervical: No cervical adenopathy.   Skin:     General: Skin is warm and dry.   Neurological:      Mental Status: She " is alert and oriented to person, place, and time.   Psychiatric:         Mood and Affect: Mood normal.         Behavior: Behavior normal.         Result Review      The following data was reviewed by Yas Castillo MD on 11/21/2022.  No results found for: WBC, HGB, HCT, MCV, PLT  Lab Results   Component Value Date    GLUCOSE 105 (H) 10/29/2020    BUN 22 10/29/2020    CREATININE 0.91 (H) 10/29/2020    BCR 24 (H) 10/29/2020    K 4.2 10/29/2020    CO2 26 10/29/2020    CALCIUM 10.0 10/29/2020    ALBUMIN 4.3 10/29/2020    LABIL2 1.9 10/29/2020    AST 16 10/29/2020    ALT 17 10/29/2020     Lab Results   Component Value Date    CHLPL 134 10/29/2020    TRIG 71 10/29/2020    HDL 54 10/29/2020    LDL 66 (L) 10/29/2020     No results found for: TSH  No results found for: HGBA1C  No results found for: PSA                    Assessment and Plan:          Diagnoses and all orders for this visit:    1. Myalgia (Primary)  Comments:  We will check an arthritis profile, she may benefit from rheumatology eval and chronic anti-inflammatory diclofenac  Orders:  -     CK; Future  -     C-reactive protein; Future  -     Rheumatoid factor; Future  -     Sedimentation rate; Future  -     DIMAS Direct Reflex to 11 Biomarker; Future    2. Arthralgia, unspecified joint  Comments:  We will check an arthritis profile, she may benefit from rheumatology eval and chronic anti-inflammatory diclofenac  Orders:  -     CK; Future  -     C-reactive protein; Future  -     Rheumatoid factor; Future  -     Sedimentation rate; Future  -     DIAMS Direct Reflex to 11 Biomarker; Future    3. Allergic rhinitis, unspecified seasonality, unspecified trigger  Comments:  Stable on loratadine.  No acute issues    4. Major depressive disorder, recurrent, mild (HCC)  Comments:  Stable on Celexa.  No changes needed in current meds or treatment plan    5. Colon cancer screening  Comments:  Unable to get colonoscopy scheduled due to her travel so will have her do  Cologuard instead  Orders:  -     Cologuard - Stool, Per Rectum; Future    6. Encounter for screening for cardiovascular disorders    7. Other fatigue  -     CBC (No Diff); Future  -     TSH+Free T4; Future    8. Murmur, cardiac  Comments:  recommend echo-she is going out of town for a year, defers, will schedule either prior to her leaving on Dec 12th or next Dec as a baseline ekg          Follow Up   Return in about 3 months (around 2/21/2023).

## 2022-11-23 ENCOUNTER — LAB (OUTPATIENT)
Dept: LAB | Facility: HOSPITAL | Age: 67
End: 2022-11-23

## 2022-11-23 DIAGNOSIS — M25.50 ARTHRALGIA, UNSPECIFIED JOINT: ICD-10-CM

## 2022-11-23 DIAGNOSIS — M79.10 MYALGIA: ICD-10-CM

## 2022-11-23 DIAGNOSIS — R53.83 OTHER FATIGUE: ICD-10-CM

## 2022-11-23 DIAGNOSIS — Z11.59 ENCOUNTER FOR SCREENING FOR OTHER VIRAL DISEASES: ICD-10-CM

## 2022-11-23 DIAGNOSIS — Z13.6 ENCOUNTER FOR SCREENING FOR CARDIOVASCULAR DISORDERS: ICD-10-CM

## 2022-11-23 LAB
ALBUMIN SERPL-MCNC: 4.8 G/DL (ref 3.5–5.2)
ALBUMIN/GLOB SERPL: 2.4 G/DL
ALP SERPL-CCNC: 55 U/L (ref 39–117)
ALT SERPL W P-5'-P-CCNC: 12 U/L (ref 1–33)
ANION GAP SERPL CALCULATED.3IONS-SCNC: 10.2 MMOL/L (ref 5–15)
AST SERPL-CCNC: 14 U/L (ref 1–32)
BILIRUB SERPL-MCNC: 0.6 MG/DL (ref 0–1.2)
BUN SERPL-MCNC: 14 MG/DL (ref 8–23)
BUN/CREAT SERPL: 18.7 (ref 7–25)
CALCIUM SPEC-SCNC: 9.9 MG/DL (ref 8.6–10.5)
CHLORIDE SERPL-SCNC: 99 MMOL/L (ref 98–107)
CHOLEST SERPL-MCNC: 307 MG/DL (ref 0–200)
CHROMATIN AB SERPL-ACNC: <10 IU/ML (ref 0–14)
CK SERPL-CCNC: 61 U/L (ref 20–180)
CO2 SERPL-SCNC: 29.8 MMOL/L (ref 22–29)
CREAT SERPL-MCNC: 0.75 MG/DL (ref 0.57–1)
CRP SERPL-MCNC: <0.3 MG/DL (ref 0–0.5)
DEPRECATED RDW RBC AUTO: 38.2 FL (ref 37–54)
EGFRCR SERPLBLD CKD-EPI 2021: 86.8 ML/MIN/1.73
ERYTHROCYTE [DISTWIDTH] IN BLOOD BY AUTOMATED COUNT: 11.8 % (ref 12.3–15.4)
ERYTHROCYTE [SEDIMENTATION RATE] IN BLOOD: 1 MM/HR (ref 0–30)
GLOBULIN UR ELPH-MCNC: 2 GM/DL
GLUCOSE SERPL-MCNC: 107 MG/DL (ref 65–99)
HCT VFR BLD AUTO: 44.2 % (ref 34–46.6)
HCV AB SER DONR QL: NORMAL
HDLC SERPL-MCNC: 75 MG/DL (ref 40–60)
HGB BLD-MCNC: 14.9 G/DL (ref 12–15.9)
LDLC SERPL CALC-MCNC: 205 MG/DL (ref 0–100)
LDLC/HDLC SERPL: 2.7 {RATIO}
MCH RBC QN AUTO: 29.8 PG (ref 26.6–33)
MCHC RBC AUTO-ENTMCNC: 33.7 G/DL (ref 31.5–35.7)
MCV RBC AUTO: 88.4 FL (ref 79–97)
PLATELET # BLD AUTO: 245 10*3/MM3 (ref 140–450)
PMV BLD AUTO: 9.7 FL (ref 6–12)
POTASSIUM SERPL-SCNC: 4.3 MMOL/L (ref 3.5–5.2)
PROT SERPL-MCNC: 6.8 G/DL (ref 6–8.5)
RBC # BLD AUTO: 5 10*6/MM3 (ref 3.77–5.28)
SODIUM SERPL-SCNC: 139 MMOL/L (ref 136–145)
T4 FREE SERPL-MCNC: 0.95 NG/DL (ref 0.93–1.7)
TRIGL SERPL-MCNC: 149 MG/DL (ref 0–150)
TSH SERPL DL<=0.05 MIU/L-ACNC: 1.75 UIU/ML (ref 0.27–4.2)
VLDLC SERPL-MCNC: 27 MG/DL (ref 5–40)
WBC NRBC COR # BLD: 4.87 10*3/MM3 (ref 3.4–10.8)

## 2022-11-23 PROCEDURE — 85652 RBC SED RATE AUTOMATED: CPT

## 2022-11-23 PROCEDURE — 86140 C-REACTIVE PROTEIN: CPT

## 2022-11-23 PROCEDURE — 84443 ASSAY THYROID STIM HORMONE: CPT

## 2022-11-23 PROCEDURE — 86803 HEPATITIS C AB TEST: CPT

## 2022-11-23 PROCEDURE — 36415 COLL VENOUS BLD VENIPUNCTURE: CPT

## 2022-11-23 PROCEDURE — 84439 ASSAY OF FREE THYROXINE: CPT

## 2022-11-23 PROCEDURE — 82550 ASSAY OF CK (CPK): CPT

## 2022-11-23 PROCEDURE — 85027 COMPLETE CBC AUTOMATED: CPT

## 2022-11-23 PROCEDURE — 80053 COMPREHEN METABOLIC PANEL: CPT

## 2022-11-23 PROCEDURE — 86431 RHEUMATOID FACTOR QUANT: CPT

## 2022-11-23 PROCEDURE — 86038 ANTINUCLEAR ANTIBODIES: CPT

## 2022-11-23 PROCEDURE — 80061 LIPID PANEL: CPT

## 2022-11-25 LAB — ANA SER QL: NEGATIVE

## 2022-12-05 ENCOUNTER — TELEPHONE (OUTPATIENT)
Dept: FAMILY MEDICINE CLINIC | Age: 67
End: 2022-12-05

## 2022-12-05 DIAGNOSIS — E78.00 PURE HYPERCHOLESTEROLEMIA: Primary | ICD-10-CM

## 2022-12-05 RX ORDER — ROSUVASTATIN CALCIUM 5 MG/1
5 TABLET, COATED ORAL DAILY
Qty: 90 TABLET | Refills: 0 | Status: SHIPPED | OUTPATIENT
Start: 2022-12-05

## 2022-12-05 NOTE — TELEPHONE ENCOUNTER
Pt came in asking about her crestor rx, looks like was never sent in please see labs on 11/23/22, [facility]

## 2022-12-14 RX ORDER — CELECOXIB 200 MG/1
200 CAPSULE ORAL DAILY
Qty: 90 CAPSULE | Refills: 0 | Status: SHIPPED | OUTPATIENT
Start: 2022-12-14 | End: 2023-03-01

## 2023-01-23 ENCOUNTER — TELEPHONE (OUTPATIENT)
Dept: FAMILY MEDICINE CLINIC | Age: 68
End: 2023-01-23
Payer: MEDICARE

## 2023-01-23 NOTE — TELEPHONE ENCOUNTER
"Checked cologaurd website states \"needs to be collected\" called patient left voicemail to complete overdue cologaurd. jose fp   "

## 2023-01-23 NOTE — TELEPHONE ENCOUNTER
----- Message from Anel Stein RN sent at 1/23/2023  8:44 AM EST -----      ----- Message -----  From: SYSTEM  Sent: 1/21/2023   1:10 AM EST  To: [facility]

## 2023-02-03 ENCOUNTER — TELEPHONE (OUTPATIENT)
Dept: FAMILY MEDICINE CLINIC | Age: 68
End: 2023-02-03

## 2023-02-03 NOTE — TELEPHONE ENCOUNTER
Caller: Senait Fong    Relationship to patient: Self    Best call back number: [phone]     Patient is needing: PATIENT CALLED IN AND SAID THAT SHE HAD MAILED IN HER COLOGUARD TEST TODAY

## 2023-03-01 DIAGNOSIS — F33.0 MAJOR DEPRESSIVE DISORDER, RECURRENT, MILD: ICD-10-CM

## 2023-03-01 RX ORDER — CELECOXIB 200 MG/1
CAPSULE ORAL
Qty: 90 CAPSULE | Refills: 3 | Status: SHIPPED | OUTPATIENT
Start: 2023-03-01

## 2023-03-01 RX ORDER — CITALOPRAM 20 MG/1
TABLET ORAL
Qty: 90 TABLET | Refills: 0 | Status: SHIPPED | OUTPATIENT
Start: 2023-03-01

## 2023-05-17 DIAGNOSIS — F33.0 MAJOR DEPRESSIVE DISORDER, RECURRENT, MILD: ICD-10-CM

## 2023-05-19 RX ORDER — CITALOPRAM 20 MG/1
TABLET ORAL
Qty: 90 TABLET | Refills: 3 | Status: SHIPPED | OUTPATIENT
Start: 2023-05-19

## 2023-11-20 ENCOUNTER — TRANSCRIBE ORDERS (OUTPATIENT)
Dept: ADMINISTRATIVE | Facility: HOSPITAL | Age: 68
End: 2023-11-20
Payer: MEDICARE

## 2023-11-20 DIAGNOSIS — Z12.31 SCREENING MAMMOGRAM, ENCOUNTER FOR: Primary | ICD-10-CM

## 2023-12-04 ENCOUNTER — HOSPITAL ENCOUNTER (OUTPATIENT)
Dept: MAMMOGRAPHY | Facility: HOSPITAL | Age: 68
Discharge: HOME | End: 2023-12-04
Admitting: FAMILY MEDICINE
Payer: MEDICARE

## 2023-12-04 DIAGNOSIS — Z12.31 SCREENING MAMMOGRAM, ENCOUNTER FOR: ICD-10-CM

## 2023-12-04 PROCEDURE — 77063 BREAST TOMOSYNTHESIS BI: CPT

## 2023-12-04 PROCEDURE — 77067 SCR MAMMO BI INCL CAD: CPT

## 2023-12-05 ENCOUNTER — TELEPHONE (OUTPATIENT)
Dept: FAMILY MEDICINE CLINIC | Age: 68
End: 2023-12-05
Payer: MEDICARE

## 2023-12-05 DIAGNOSIS — E78.00 PURE HYPERCHOLESTEROLEMIA: Primary | ICD-10-CM

## 2023-12-05 NOTE — TELEPHONE ENCOUNTER
Patient is calling to check on the status of her referral to see a Diabetic Educator. Writer let her know we are waiting to hear the response from Dr. Lenore Lujan. Please call Kika Magallanes back at [phone]. Pt is requesting labs before appt please advise

## 2023-12-11 ENCOUNTER — LAB (OUTPATIENT)
Dept: LAB | Facility: HOSPITAL | Age: 68
End: 2023-12-11
Payer: MEDICARE

## 2023-12-11 DIAGNOSIS — E78.00 PURE HYPERCHOLESTEROLEMIA: ICD-10-CM

## 2023-12-11 LAB
ALBUMIN SERPL-MCNC: 4.7 G/DL (ref 3.5–5.2)
ALBUMIN/GLOB SERPL: 2.6 G/DL
ALP SERPL-CCNC: 52 U/L (ref 39–117)
ALT SERPL W P-5'-P-CCNC: 15 U/L (ref 1–33)
ANION GAP SERPL CALCULATED.3IONS-SCNC: 9 MMOL/L (ref 5–15)
AST SERPL-CCNC: 14 U/L (ref 1–32)
BILIRUB SERPL-MCNC: 0.5 MG/DL (ref 0–1.2)
BUN SERPL-MCNC: 14 MG/DL (ref 8–23)
BUN/CREAT SERPL: 20.6 (ref 7–25)
CALCIUM SPEC-SCNC: 9.8 MG/DL (ref 8.6–10.5)
CHLORIDE SERPL-SCNC: 100 MMOL/L (ref 98–107)
CHOLEST SERPL-MCNC: 273 MG/DL (ref 0–200)
CO2 SERPL-SCNC: 28 MMOL/L (ref 22–29)
CREAT SERPL-MCNC: 0.68 MG/DL (ref 0.57–1)
DEPRECATED RDW RBC AUTO: 39.7 FL (ref 37–54)
EGFRCR SERPLBLD CKD-EPI 2021: 94.4 ML/MIN/1.73
ERYTHROCYTE [DISTWIDTH] IN BLOOD BY AUTOMATED COUNT: 12.2 % (ref 12.3–15.4)
GLOBULIN UR ELPH-MCNC: 1.8 GM/DL
GLUCOSE SERPL-MCNC: 105 MG/DL (ref 65–99)
HCT VFR BLD AUTO: 41.5 % (ref 34–46.6)
HDLC SERPL-MCNC: 67 MG/DL (ref 40–60)
HGB BLD-MCNC: 13.9 G/DL (ref 12–15.9)
LDLC SERPL CALC-MCNC: 181 MG/DL (ref 0–100)
LDLC/HDLC SERPL: 2.66 {RATIO}
MCH RBC QN AUTO: 29.8 PG (ref 26.6–33)
MCHC RBC AUTO-ENTMCNC: 33.5 G/DL (ref 31.5–35.7)
MCV RBC AUTO: 88.9 FL (ref 79–97)
PLATELET # BLD AUTO: 260 10*3/MM3 (ref 140–450)
PMV BLD AUTO: 9.4 FL (ref 6–12)
POTASSIUM SERPL-SCNC: 4.3 MMOL/L (ref 3.5–5.2)
PROT SERPL-MCNC: 6.5 G/DL (ref 6–8.5)
RBC # BLD AUTO: 4.67 10*6/MM3 (ref 3.77–5.28)
SODIUM SERPL-SCNC: 137 MMOL/L (ref 136–145)
TRIGL SERPL-MCNC: 138 MG/DL (ref 0–150)
VLDLC SERPL-MCNC: 25 MG/DL (ref 5–40)
WBC NRBC COR # BLD AUTO: 4.7 10*3/MM3 (ref 3.4–10.8)

## 2023-12-11 PROCEDURE — 85027 COMPLETE CBC AUTOMATED: CPT

## 2023-12-11 PROCEDURE — 80061 LIPID PANEL: CPT

## 2023-12-11 PROCEDURE — 80053 COMPREHEN METABOLIC PANEL: CPT

## 2023-12-11 PROCEDURE — 36415 COLL VENOUS BLD VENIPUNCTURE: CPT

## 2023-12-12 ENCOUNTER — OFFICE VISIT (OUTPATIENT)
Dept: FAMILY MEDICINE CLINIC | Age: 68
End: 2023-12-12
Payer: MEDICARE

## 2023-12-12 VITALS
WEIGHT: 164.8 LBS | OXYGEN SATURATION: 97 % | TEMPERATURE: 97.8 F | BODY MASS INDEX: 30.33 KG/M2 | DIASTOLIC BLOOD PRESSURE: 92 MMHG | HEART RATE: 56 BPM | SYSTOLIC BLOOD PRESSURE: 158 MMHG | HEIGHT: 62 IN

## 2023-12-12 DIAGNOSIS — H61.23 BILATERAL IMPACTED CERUMEN: ICD-10-CM

## 2023-12-12 DIAGNOSIS — R01.1 MURMUR, CARDIAC: ICD-10-CM

## 2023-12-12 DIAGNOSIS — I10 ESSENTIAL HYPERTENSION: ICD-10-CM

## 2023-12-12 DIAGNOSIS — R05.2 SUBACUTE COUGH: ICD-10-CM

## 2023-12-12 DIAGNOSIS — E78.00 PURE HYPERCHOLESTEROLEMIA: ICD-10-CM

## 2023-12-12 DIAGNOSIS — L30.9 ECZEMA, UNSPECIFIED TYPE: ICD-10-CM

## 2023-12-12 DIAGNOSIS — E66.09 CLASS 1 OBESITY DUE TO EXCESS CALORIES WITH SERIOUS COMORBIDITY AND BODY MASS INDEX (BMI) OF 30.0 TO 30.9 IN ADULT: ICD-10-CM

## 2023-12-12 DIAGNOSIS — Z12.11 COLON CANCER SCREENING: ICD-10-CM

## 2023-12-12 DIAGNOSIS — H93.233 HEARING ABNORMALLY ACUTE, BILATERAL: ICD-10-CM

## 2023-12-12 DIAGNOSIS — M79.10 MYALGIA: ICD-10-CM

## 2023-12-12 DIAGNOSIS — Z12.31 ENCOUNTER FOR SCREENING MAMMOGRAM FOR BREAST CANCER: ICD-10-CM

## 2023-12-12 DIAGNOSIS — Z78.0 POSTMENOPAUSAL STATE: ICD-10-CM

## 2023-12-12 DIAGNOSIS — J30.9 ALLERGIC RHINITIS, UNSPECIFIED SEASONALITY, UNSPECIFIED TRIGGER: ICD-10-CM

## 2023-12-12 DIAGNOSIS — Z23 ENCOUNTER FOR IMMUNIZATION: ICD-10-CM

## 2023-12-12 DIAGNOSIS — F33.0 MAJOR DEPRESSIVE DISORDER, RECURRENT, MILD: ICD-10-CM

## 2023-12-12 DIAGNOSIS — Z00.00 ENCOUNTER FOR ANNUAL WELLNESS EXAM IN MEDICARE PATIENT: Primary | ICD-10-CM

## 2023-12-12 DIAGNOSIS — F51.01 PRIMARY INSOMNIA: ICD-10-CM

## 2023-12-12 PROBLEM — E66.811 CLASS 1 OBESITY DUE TO EXCESS CALORIES WITH SERIOUS COMORBIDITY AND BODY MASS INDEX (BMI) OF 30.0 TO 30.9 IN ADULT: Status: ACTIVE | Noted: 2023-12-12

## 2023-12-12 RX ORDER — TRAZODONE HYDROCHLORIDE 50 MG/1
50 TABLET ORAL NIGHTLY PRN
Qty: 90 TABLET | Refills: 1 | Status: SHIPPED | OUTPATIENT
Start: 2023-12-12 | End: 2023-12-12 | Stop reason: SDUPTHER

## 2023-12-12 RX ORDER — LISINOPRIL 10 MG/1
10 TABLET ORAL DAILY
Qty: 90 TABLET | Refills: 1 | Status: SHIPPED | OUTPATIENT
Start: 2023-12-12

## 2023-12-12 RX ORDER — ATORVASTATIN CALCIUM 10 MG/1
10 TABLET, FILM COATED ORAL DAILY
Qty: 90 TABLET | Refills: 1 | Status: SHIPPED | OUTPATIENT
Start: 2023-12-12

## 2023-12-12 RX ORDER — ATORVASTATIN CALCIUM 10 MG/1
10 TABLET, FILM COATED ORAL DAILY
Qty: 90 TABLET | Refills: 1 | Status: SHIPPED | OUTPATIENT
Start: 2023-12-12 | End: 2023-12-12 | Stop reason: SDUPTHER

## 2023-12-12 RX ORDER — LISINOPRIL 10 MG/1
10 TABLET ORAL DAILY
Qty: 90 TABLET | Refills: 1 | Status: SHIPPED | OUTPATIENT
Start: 2023-12-12 | End: 2023-12-12 | Stop reason: SDUPTHER

## 2023-12-12 RX ORDER — TRAZODONE HYDROCHLORIDE 50 MG/1
50 TABLET ORAL NIGHTLY PRN
Qty: 90 TABLET | Refills: 1 | Status: SHIPPED | OUTPATIENT
Start: 2023-12-12

## 2023-12-12 RX ORDER — FLUTICASONE PROPIONATE AND SALMETEROL 100; 50 UG/1; UG/1
1 POWDER RESPIRATORY (INHALATION)
Qty: 1 EACH | Refills: 0 | Status: SHIPPED | OUTPATIENT
Start: 2023-12-12 | End: 2023-12-12 | Stop reason: SDUPTHER

## 2023-12-12 RX ORDER — FLUTICASONE PROPIONATE AND SALMETEROL 100; 50 UG/1; UG/1
1 POWDER RESPIRATORY (INHALATION)
Qty: 1 EACH | Refills: 0 | Status: SHIPPED | OUTPATIENT
Start: 2023-12-12

## 2023-12-12 NOTE — PROGRESS NOTES
Her anything I can help on the ABCs of the Annual Wellness Visit  Subsequent Medicare Wellness Visit    Subjective    Senait Fong is a 68 y.o. female who presents for a Subsequent Medicare Wellness Visit.    The following portions of the patient's history were reviewed and   updated as appropriate: allergies, current medications, past family history, past medical history, past social history, past surgical history, and problem list.    Compared to one year ago, the patient feels her physical   health is the same.    Compared to one year ago, the patient feels her mental   health is worse.  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       Advance Care Planning  Advance Directive is not on file.  ACP discussion was held with the patient during this visit. Patient does not have an advance directive, information provided.    Does the patient have evidence of cognitive impairment? No    Aspirin is not on active medication list.  Aspirin use is not indicated based on review of current medical condition/s. Risk of harm outweighs potential benefits.  .    Patient Active Problem List   Diagnosis    Encounter for annual wellness exam in Medicare patient    Pure hypercholesterolemia    Major depressive disorder, recurrent, mild    Myalgia    Allergic rhinitis    Murmur, cardiac    Eczema    Primary insomnia    Subacute cough    Essential hypertension    Class 1 obesity due to excess calories with serious comorbidity and body mass index (BMI) of 30.0 to 30.9 in adult    Bilateral impacted cerumen    Hearing abnormally acute, bilateral       Current Medical Providers:  Patient Care Team:  Yas Castillo MD as PCP - General (Family Medicine)    No opioid medication identified on active medication list. I have reviewed chart for other potential  high risk medication/s and  "harmful drug interactions in the elderly.             Objective    Vitals:    23 1518 23 1619   BP: 158/92    BP Location: Right arm    Patient Position: Sitting    Pulse: 56    Temp: 97.8 °F (36.6 °C)    TempSrc: Oral    SpO2: 97%    Weight: 76.7 kg (169 lb) 74.8 kg (164 lb 12.8 oz)   Height: 156.8 cm (61.73\")      Estimated body mass index is 30.4 kg/m² as calculated from the following:    Height as of this encounter: 156.8 cm (61.73\").    Weight as of this encounter: 74.8 kg (164 lb 12.8 oz).    BMI is >= 30 and <35. (Class 1 Obesity). The following options were offered after discussion;: exercise counseling/recommendations and nutrition counseling/recommendations          Lab Results   Component Value Date    TRIG 138 2023    HDL 67 (H) 2023     (H) 2023    VLDL 25 2023        HEALTH RISK ASSESSMENT    Smoking Status:  Social History     Tobacco Use   Smoking Status Never   Smokeless Tobacco Never     Alcohol Consumption:  Social History     Substance and Sexual Activity   Alcohol Use Yes     Fall Risk Screen:    STEADI Fall Risk Assessment was completed, and patient is at LOW risk for falls.Assessment completed on:2023    Depression Screenin/12/2023     3:20 PM   PHQ-2/PHQ-9 Depression Screening   Little Interest or Pleasure in Doing Things 0-->not at all   Feeling Down, Depressed or Hopeless 1-->several days   PHQ-9: Brief Depression Severity Measure Score 1       Health Habits and Functional and Cognitive Screenin/12/2023     3:20 PM   Functional & Cognitive Status   Do you have difficulty preparing food and eating? No   Do you have difficulty bathing yourself, getting dressed or grooming yourself? No   Do you have difficulty using the toilet? No   Do you have difficulty moving around from place to place? No   Do you have trouble with steps or getting out of a bed or a chair? No   Current Diet Unhealthy Diet   Dental Exam Not up to date "   Eye Exam Up to date   Exercise (times per week) 0 times per week   Current Exercises Include No Regular Exercise   Do you need help using the phone?  No   Are you deaf or do you have serious difficulty hearing?  No   Do you need help to go to places out of walking distance? No   Do you need help shopping? No   Do you need help preparing meals?  No   Do you need help with housework?  No   Do you need help with laundry? No   Do you need help taking your medications? No   Do you need help managing money? No   Do you ever drive or ride in a car without wearing a seat belt? No   Have you felt unusual stress, anger or loneliness in the last month? No   Who do you live with? Spouse   If you need help, do you have trouble finding someone available to you? No   Have you been bothered in the last four weeks by sexual problems? No   Do you have difficulty concentrating, remembering or making decisions? No       Age-appropriate Screening Schedule:  Refer to the list below for future screening recommendations based on patient's age, sex and/or medical conditions. Orders for these recommended tests are listed in the plan section. The patient has been provided with a written plan.    Health Maintenance   Topic Date Due    DXA SCAN  04/26/2023    INFLUENZA VACCINE  03/31/2024 (Originally 8/1/2023)    COVID-19 Vaccine (1) 05/06/2024 (Originally 1955)    TDAP/TD VACCINES (1 - Tdap) 05/13/2024 (Originally 1/3/1974)    Pneumococcal Vaccine 65+ (1 - PCV) 12/12/2024 (Originally 1/3/2019)    ZOSTER VACCINE (1 of 2) 12/12/2024 (Originally 1/3/2004)    LIPID PANEL  12/11/2024    ANNUAL WELLNESS VISIT  12/12/2024    BMI FOLLOWUP  12/12/2024    MAMMOGRAM  12/04/2025    COLORECTAL CANCER SCREENING  02/03/2026    HEPATITIS C SCREENING  Completed              Outpatient Medications Prior to Visit   Medication Sig Dispense Refill    celecoxib (CeleBREX) 200 MG capsule TAKE 1 CAPSULE DAILY 90 capsule 3    citalopram (CeleXA) 20 MG tablet  TAKE 1 TABLET EVERY NIGHT AT BEDTIME 90 tablet 3    loratadine (CLARITIN) 10 MG tablet Take 1 tablet by mouth Daily.      multivitamin with minerals tablet tablet Take 1 tablet by mouth Daily.      rosuvastatin (Crestor) 5 MG tablet Take 1 tablet by mouth Daily. (Patient not taking: Reported on 12/12/2023) 90 tablet 0     No facility-administered medications prior to visit.       CMS Preventative Services Quick Reference  Risk Factors Identified During Encounter  Alcohol Misuse: Patient encouraged to limit alcohol use to no more than 1 standard alcoholic beverage per day. (12 ounce beer, 6 ounce wine, one shot liquor)  Depression/Dysphoria: Current medication adjusted.  Follow up visit planned.  Hearing Problem: Referral to Audiologist ordered  Immunizations Discussed/Encouraged: Tdap, Influenza, Prevnar 20 (Pneumococcal 20-valent conjugate), Shingrix, and COVID19  Inadequate Social Support, Isolation, Loneliness, Lack of Transportation, Financial Difficulties, or Caregiver Stress: she defers therapy at this time  Inactivity/Sedentary: Patient was advised to exercise at least 150 minutes a week per CDC recommendations.  Dental Screening Recommended  Vision Screening Recommended  The above risks/problems have been discussed with the patient.  Pertinent information has been shared with the patient in the After Visit Summary.  An After Visit Summary and PPPS were made available to the patient.    Follow Up:   Next Medicare Wellness visit to be scheduled in 1 year.       Additional E&M Note during same encounter follows:  Patient has multiple medical problems which are significant and separately identifiable that require additional work above and beyond the Medicare Wellness Visit.         Senait Fong presents to [facility].    Chief Complaint:  elevated BP and cholesterol        Subjective   History of Present Illness:  HPI     Chronic allergies stable on loratadine    She is on  "celexa for depression and she is worse with a lot of stressors in her life, she also has more anxiety.  Few crying spells.  She feels very sensitive, can be overwhelmed, not sleeping well.  Denies SI/HI.    She has eczema and her skin is clear of rash and doing well    Labs reviewed today and her cholesterol is very high.  She was counseled on low-cholesterol diet. Myalgias on crestor, she is doing better off the crestor.  She is on celebrex daily without body aches-I recommend she stop this med and see how she does.            Result Review   The following data was reviewed by Yas Castillo MD on 12/12/2023.  Lab Results   Component Value Date    WBC 4.70 12/11/2023    HGB 13.9 12/11/2023    HCT 41.5 12/11/2023    MCV 88.9 12/11/2023     12/11/2023     Lab Results   Component Value Date    GLUCOSE 105 (H) 12/11/2023    BUN 14 12/11/2023    CREATININE 0.68 12/11/2023    EGFR 94.4 12/11/2023    BCR 20.6 12/11/2023    K 4.3 12/11/2023    CO2 28.0 12/11/2023    CALCIUM 9.8 12/11/2023    ALBUMIN 4.7 12/11/2023    BILITOT 0.5 12/11/2023    AST 14 12/11/2023    ALT 15 12/11/2023     Lab Results   Component Value Date    CHOL 273 (H) 12/11/2023    CHLPL 134 10/29/2020    TRIG 138 12/11/2023    HDL 67 (H) 12/11/2023     (H) 12/11/2023     Lab Results   Component Value Date    TSH 1.750 11/23/2022     No results found for: \"HGBA1C\"  No results found for: \"PSA\"      No results found for: \"VMSC19WC\"          Assessment and Plan:   Diagnoses and all orders for this visit:    1. Encounter for annual wellness exam in Medicare patient (Primary)    2. Postmenopausal state  Comments:  dexa ordered  Orders:  -     DEXA Bone Density Axial; Future    3. Encounter for screening mammogram for breast cancer  Comments:  breast exam WNL, mammogram is UTD    4. Encounter for immunization  Comments:  recc tdap, shingrix, covid, flu and prevnar 20 immunizations-she defers    5. Colon cancer screening  Comments:  darin " is UTD    6. Pure hypercholesterolemia  Comments:  Myalgias with Crestor so I will change her to Lipitor and she will watch for myalgias.  Counseled on low-cholesterol diet.  F/u 3 months  Orders:  -     Discontinue: atorvastatin (Lipitor) 10 MG tablet; Take 1 tablet by mouth Daily.  Dispense: 90 tablet; Refill: 1  -     atorvastatin (Lipitor) 10 MG tablet; Take 1 tablet by mouth Daily.  Dispense: 90 tablet; Refill: 1    7. Major depressive disorder, recurrent, mild  Comments:  Worse.  She is on Celexa and under a lot of stress. No SI/HI.Not sleeping well.  Will add trazodone and have her follow-up in 3 months.  Sooner if worsening sxs    8. Myalgia  Comments:  Resolved stopping Crestor.  Will have her stop the Celebrex and see if she has any issues off this medication    9. Allergic rhinitis, unspecified seasonality, unspecified trigger  Comments:  Chronic allergies are overall stable on Claritin as needed    10. Murmur, cardiac    11. Eczema, unspecified type  Comments:  Skin is clear and she has no acute issues today.  Continue topical lotion, gentle soaps and lukewarm showers, avoid perfumes/dyes in cloths detergent    12. Primary insomnia  Comments:  She is already on melatonin.  Will try her on trazodone to improve her depression and insomnia.  Follow-up 3 months, sooner if no improvement/worsening symptoms  Orders:  -     Discontinue: traZODone (DESYREL) 50 MG tablet; Take 1 tablet by mouth At Night As Needed for Sleep.  Dispense: 90 tablet; Refill: 1  -     traZODone (DESYREL) 50 MG tablet; Take 1 tablet by mouth At Night As Needed for Sleep.  Dispense: 90 tablet; Refill: 1    13. Subacute cough  Comments:  s/p prolonged URI, with some wheezing, will treat with advair  Orders:  -     Discontinue: Fluticasone-Salmeterol (ADVAIR/WIXELA) 100-50 MCG/ACT DISKUS; Inhale 1 puff 2 (Two) Times a Day.  Dispense: 1 each; Refill: 0  -     Fluticasone-Salmeterol (ADVAIR/WIXELA) 100-50 MCG/ACT DISKUS; Inhale 1 puff 2  (Two) Times a Day.  Dispense: 1 each; Refill: 0    14. Class 1 obesity due to excess calories with serious comorbidity and body mass index (BMI) of 30.0 to 30.9 in adult  Comments:  [facility] dietician referral and she defers, counseled on daily exercise and low missael diet    15. Essential hypertension  Comments:  new diagnosis, will start her on llisinopril and she is to exercise, avoid Na in diet.  Orders:  -     Discontinue: lisinopril (PRINIVIL,ZESTRIL) 10 MG tablet; Take 1 tablet by mouth Daily.  Dispense: 90 tablet; Refill: 1  -     lisinopril (PRINIVIL,ZESTRIL) 10 MG tablet; Take 1 tablet by mouth Daily.  Dispense: 90 tablet; Refill: 1    16. Bilateral impacted cerumen  Comments:  Status post irrigation ears bilaterally  Orders:  -     Ear Cerumen Removal    17. Hearing abnormally acute, bilateral  Comments:  She had a recent cold and still has clogging of the ears.  Will have her start Flonase and hold nose and swallow 10 times a day and follow-up for hearing test  Orders:  -     Cancel: Screening Test Pure Tone, Air Only; Future  -     Screening Test Pure Tone, Air Only; Future                    Medications:      Current Outpatient Medications:     atorvastatin (Lipitor) 10 MG tablet, Take 1 tablet by mouth Daily., Disp: 90 tablet, Rfl: 1    celecoxib (CeleBREX) 200 MG capsule, TAKE 1 CAPSULE DAILY, Disp: 90 capsule, Rfl: 3    citalopram (CeleXA) 20 MG tablet, TAKE 1 TABLET EVERY NIGHT AT BEDTIME, Disp: 90 tablet, Rfl: 3    Fluticasone-Salmeterol (ADVAIR/WIXELA) 100-50 MCG/ACT DISKUS, Inhale 1 puff 2 (Two) Times a Day., Disp: 1 each, Rfl: 0    lisinopril (PRINIVIL,ZESTRIL) 10 MG tablet, Take 1 tablet by mouth Daily., Disp: 90 tablet, Rfl: 1    loratadine (CLARITIN) 10 MG tablet, Take 1 tablet by mouth Daily., Disp: , Rfl:     multivitamin with minerals tablet tablet, Take 1 tablet by mouth Daily., Disp: , Rfl:     traZODone (DESYREL) 50 MG tablet, Take 1 tablet by mouth At Night As Needed for Sleep., Disp: 90  "tablet, Rfl: 1       Objective   Vital Signs:   /92 (BP Location: Right arm, Patient Position: Sitting)   Pulse 56   Temp 97.8 °F (36.6 °C) (Oral)   Ht 156.8 cm (61.73\")   Wt 74.8 kg (164 lb 12.8 oz)   SpO2 97%   BMI 30.40 kg/m²       Physical Exam:  Physical Exam  Vitals and nursing note reviewed.   Constitutional:       General: She is not in acute distress.     Appearance: Normal appearance. She is not ill-appearing, toxic-appearing or diaphoretic.   HENT:      Head: Normocephalic and atraumatic.      Right Ear: Tympanic membrane, ear canal and external ear normal. There is impacted cerumen.      Left Ear: Tympanic membrane, ear canal and external ear normal. There is impacted cerumen.      Nose: No congestion or rhinorrhea.      Mouth/Throat:      Mouth: Mucous membranes are moist.      Pharynx: Oropharynx is clear. No oropharyngeal exudate or posterior oropharyngeal erythema.   Eyes:      Extraocular Movements: Extraocular movements intact.      Conjunctiva/sclera: Conjunctivae normal.      Pupils: Pupils are equal, round, and reactive to light.   Cardiovascular:      Rate and Rhythm: Normal rate and regular rhythm.      Heart sounds: Normal heart sounds.   Pulmonary:      Effort: Pulmonary effort is normal.      Breath sounds: Normal breath sounds. No wheezing, rhonchi or rales.   Chest:   Breasts:     Right: Normal.      Left: Normal.   Abdominal:      General: Abdomen is flat.      Palpations: Abdomen is soft. There is no mass.      Tenderness: There is no abdominal tenderness.      Hernia: No hernia is present.   Musculoskeletal:      Cervical back: Neck supple. No rigidity.      Right lower leg: No edema.      Left lower leg: No edema.   Lymphadenopathy:      Cervical: No cervical adenopathy.   Skin:     General: Skin is warm and dry.   Neurological:      General: No focal deficit present.      Mental Status: She is alert and oriented to person, place, and time. Mental status is at baseline. "   Psychiatric:         Mood and Affect: Mood normal.         Behavior: Behavior normal.         Thought Content: Thought content normal.         Judgment: Judgment normal.                     Review of Systems:  Review of Systems   Constitutional:  Negative for chills and fever.   HENT:  Negative for ear pain, sinus pressure and sore throat.    Eyes:  Negative for blurred vision and double vision.   Respiratory:  Positive for cough. Negative for shortness of breath and wheezing.    Cardiovascular:  Negative for chest pain, palpitations and leg swelling.   Gastrointestinal:  Negative for abdominal pain, blood in stool, constipation, diarrhea, nausea and vomiting.   Skin:  Negative for rash.   Neurological:  Negative for dizziness and headache.   Psychiatric/Behavioral:  Positive for sleep disturbance and depressed mood. Negative for suicidal ideas. The patient is nervous/anxious.             Follow Up   Return in about 3 months (around 3/12/2024) for Recheck.    Part of this note may be an electronic transcription/translation of spoken language to printed   text using the Dragon Dictation System.            Medical History:  No past medical history on file.  No past surgical history on file.   History reviewed. No pertinent family history.  Social History     Tobacco Use    Smoking status: Never    Smokeless tobacco: Never   Substance Use Topics    Alcohol use: Yes       Health Maintenance Due   Topic Date Due    DXA SCAN  04/26/2023          There is no immunization history on file for this patient.    Allergies   Allergen Reactions    Codeine Nausea Only

## 2023-12-12 NOTE — PATIENT INSTRUCTIONS
"High Cholesterol    High cholesterol is a condition in which the blood has high levels of a white, waxy substance similar to fat (cholesterol). The liver makes all the cholesterol that the body needs. The human body needs small amounts of cholesterol to help build cells. A person gets extra or excess cholesterol from the food that he or she eats.  The blood carries cholesterol from the liver to the rest of the body. If you have high cholesterol, deposits (plaques) may build up on the walls of your arteries. Arteries are the blood vessels that carry blood away from your heart. These plaques make the arteries narrow and stiff.  Cholesterol plaques increase your risk for heart attack and stroke. Work with your health care provider to keep your cholesterol levels in a healthy range.  What increases the risk?  The following factors may make you more likely to develop this condition:  Eating foods that are high in animal fat (saturated fat) or cholesterol.  Being overweight.  Not getting enough exercise.  A family history of high cholesterol (familial hypercholesterolemia).  Use of tobacco products.  Having diabetes.  What are the signs or symptoms?  In most cases, high cholesterol does not usually cause any symptoms.  In severe cases, very high cholesterol levels can cause:  Fatty bumps under the skin (xanthomas).  A white or gray ring around the black center (pupil) of the eye.  How is this diagnosed?  This condition may be diagnosed based on the results of a blood test.  If you are older than 20 years of age, your health care provider may check your cholesterol levels every 4-6 years.  You may be checked more often if you have high cholesterol or other risk factors for heart disease.  The blood test for cholesterol measures:  \"Bad\" cholesterol, or LDL cholesterol. This is the main type of cholesterol that causes heart disease. The desired level is less than 100 mg/dL (2.59 mmol/L).  \"Good\" cholesterol, or HDL " cholesterol. HDL helps protect against heart disease by cleaning the arteries and carrying the LDL to the liver for processing. The desired level for HDL is 60 mg/dL (1.55 mmol/L) or higher.  Triglycerides. These are fats that your body can store or burn for energy. The desired level is less than 150 mg/dL (1.69 mmol/L).  Total cholesterol. This measures the total amount of cholesterol in your blood and includes LDL, HDL, and triglycerides. The desired level is less than 200 mg/dL (5.17 mmol/L).  How is this treated?  Treatment for high cholesterol starts with lifestyle changes, such as diet and exercise.  Diet changes. You may be asked to eat foods that have more fiber and less saturated fats or added sugar.  Lifestyle changes. These may include regular exercise, maintaining a healthy weight, and quitting use of tobacco products.  Medicines. These are given when diet and lifestyle changes have not worked. You may be prescribed a statin medicine to help lower your cholesterol levels.  Follow these instructions at home:  Eating and drinking    Eat a healthy, balanced diet. This diet includes:  Daily servings of a variety of fresh, frozen, or canned fruits and vegetables.  Daily servings of whole grain foods that are rich in fiber.  Foods that are low in saturated fats and trans fats. These include poultry and fish without skin, lean cuts of meat, and low-fat dairy products.  A variety of fish, especially oily fish that contain omega-3 fatty acids. Aim to eat fish at least 2 times a week.  Avoid foods and drinks that have added sugar.  Use healthy cooking methods, such as roasting, grilling, broiling, baking, poaching, steaming, and stir-frying. Do not lorenz your food except for stir-frying.  If you drink alcohol:  Limit how much you have to:  0-1 drink a day for women who are not pregnant.  0-2 drinks a day for men.  Know how much alcohol is in a drink. In the [place] one drink equals one 12 oz bottle of beer (355 mL),  "one 5 oz glass of wine (148 mL), or one 1½ oz glass of hard liquor (44 mL).  Lifestyle    Get regular exercise. Aim to exercise for a total of 150 minutes a week. Increase your activity level by doing activities such as gardening, walking, and taking the stairs.  Do not use any products that contain nicotine or tobacco. These products include cigarettes, chewing tobacco, and vaping devices, such as e-cigarettes. If you need help quitting, ask your health care provider.  General instructions  Take over-the-counter and prescription medicines only as told by your health care provider.  Keep all follow-up visits. This is important.  Where to find more information  American Heart Association: www.heart.org  National Heart, Lung, and Blood [place]: www.nhlbi.nih.gov  Contact a health care provider if:  You have trouble achieving or maintaining a healthy diet or weight.  You are starting an exercise program.  You are unable to stop smoking.  Get help right away if:  You have chest pain.  You have trouble breathing.  You have discomfort or pain in your jaw, neck, back, shoulder, or arm.  You have any symptoms of a stroke. \"BE FAST\" is an easy way to remember the main warning signs of a stroke:  B - Balance. Signs are dizziness, sudden trouble walking, or loss of balance.  E - Eyes. Signs are trouble seeing or a sudden change in vision.  F - Face. Signs are sudden weakness or numbness of the face, or the face or eyelid drooping on one side.  A - Arms. Signs are weakness or numbness in an arm. This happens suddenly and usually on one side of the body.  S - Speech. Signs are sudden trouble speaking, slurred speech, or trouble understanding what people say.  T - Time. Time to call emergency services. Write down what time symptoms started.  You have other signs of a stroke, such as:  A sudden, severe headache with no known cause.  Nausea or vomiting.  Seizure.  These symptoms may represent a serious problem that is an " emergency. Do not wait to see if the symptoms will go away. Get medical help right away. Call your local emergency services (911 in the [place] Do not drive yourself to the hospital.  Summary  Cholesterol plaques increase your risk for heart attack and stroke. Work with your health care provider to keep your cholesterol levels in a healthy range.  Eat a healthy, balanced diet, get regular exercise, and maintain a healthy weight.  Do not use any products that contain nicotine or tobacco. These products include cigarettes, chewing tobacco, and vaping devices, such as e-cigarettes.  Get help right away if you have any symptoms of a stroke.  This information is not intended to replace advice given to you by your health care provider. Make sure you discuss any questions you have with your health care provider.  Document Revised: 07/21/2023 Document Reviewed: 02/21/2022  [employer] Patient Education © 2023 [employer]  Calorie Counting for Weight Loss  Calories are units of energy. Your body needs a certain number of calories from food to keep going throughout the day. When you eat or drink more calories than your body needs, your body stores the extra calories mostly as fat. When you eat or drink fewer calories than your body needs, your body burns fat to get the energy it needs.  Calorie counting means keeping track of how many calories you eat and drink each day. Calorie counting can be helpful if you need to lose weight. If you eat fewer calories than your body needs, you should lose weight. Ask your health care provider what a healthy weight is for you.  For calorie counting to work, you will need to eat the right number of calories each day to lose a healthy amount of weight per week. A dietitian can help you figure out how many calories you need in a day and will suggest ways to reach your calorie goal.  A healthy amount of weight to lose each week is usually 1-2 lb (0.5-0.9 kg). This usually means that your daily  calorie intake should be reduced by 500-750 calories.  Eating 1,200-1,500 calories a day can help most women lose weight.  Eating 1,500-1,800 calories a day can help most men lose weight.  What do I need to know about calorie counting?  Work with your health care provider or dietitian to determine how many calories you should get each day. To meet your daily calorie goal, you will need to:  Find out how many calories are in each food that you would like to eat. Try to do this before you eat.  Decide how much of the food you plan to eat.  Keep a food log. Do this by writing down what you ate and how many calories it had.  To successfully lose weight, it is important to balance calorie counting with a healthy lifestyle that includes regular activity.  Where do I find calorie information?    The number of calories in a food can be found on a Nutrition Facts label. If a food does not have a Nutrition Facts label, try to look up the calories online or ask your dietitian for help.  Remember that calories are listed per serving. If you choose to have more than one serving of a food, you will have to multiply the calories per serving by the number of servings you plan to eat. For example, the label on a package of bread might say that a serving size is 1 slice and that there are 90 calories in a serving. If you eat 1 slice, you will have eaten 90 calories. If you eat 2 slices, you will have eaten 180 calories.  How do I keep a food log?  After each time that you eat, record the following in your food log as soon as possible:  What you ate. Be sure to include toppings, sauces, and other extras on the food.  How much you ate. This can be measured in cups, ounces, or number of items.  How many calories were in each food and drink.  The total number of calories in the food you ate.  Keep your food log near you, such as in a pocket-sized notebook or on an sosa or website on your mobile phone. Some programs will calculate calories  for you and show you how many calories you have left to meet your daily goal.  What are some portion-control tips?  Know how many calories are in a serving. This will help you know how many servings you can have of a certain food.  Use a measuring cup to measure serving sizes. You could also try weighing out portions on a kitchen scale. With time, you will be able to estimate serving sizes for some foods.  Take time to put servings of different foods on your favorite plates or in your favorite bowls and cups so you know what a serving looks like.  Try not to eat straight from a food's packaging, such as from a bag or box. Eating straight from the package makes it hard to see how much you are eating and can lead to overeating. Put the amount you would like to eat in a cup or on a plate to make sure you are eating the right portion.  Use smaller plates, glasses, and bowls for smaller portions and to prevent overeating.  Try not to multitask. For example, avoid watching TV or using your computer while eating. If it is time to eat, sit down at a table and enjoy your food. This will help you recognize when you are full. It will also help you be more mindful of what and how much you are eating.  What are tips for following this plan?  Reading food labels  Check the calorie count compared with the serving size. The serving size may be smaller than what you are used to eating.  Check the source of the calories. Try to choose foods that are high in protein, fiber, and vitamins, and low in saturated fat, trans fat, and sodium.  Shopping  Read nutrition labels while you shop. This will help you make healthy decisions about which foods to buy.  Pay attention to nutrition labels for low-fat or fat-free foods. These foods sometimes have the same number of calories or more calories than the full-fat versions. They also often have added sugar, starch, or salt to make up for flavor that was removed with the fat.  Make a grocery list  of lower-calorie foods and stick to it.  Cooking  Try to cook your favorite foods in a healthier way. For example, try baking instead of frying.  Use low-fat dairy products.  Meal planning  Use more fruits and vegetables. One-half of your plate should be fruits and vegetables.  Include lean proteins, such as chicken, turkey, and fish.  Lifestyle  Each week, aim to do one of the followin minutes of moderate exercise, such as walking.  75 minutes of vigorous exercise, such as running.  General information  Know how many calories are in the foods you eat most often. This will help you calculate calorie counts faster.  Find a way of tracking calories that works for you. Get creative. Try different apps or programs if writing down calories does not work for you.  What foods should I eat?    Eat nutritious foods. It is better to have a nutritious, high-calorie food, such as an avocado, than a food with few nutrients, such as a bag of potato chips.  Use your calories on foods and drinks that will fill you up and will not leave you hungry soon after eating.  Examples of foods that fill you up are nuts and nut butters, vegetables, lean proteins, and high-fiber foods such as whole grains. High-fiber foods are foods with more than 5 g of fiber per serving.  Pay attention to calories in drinks. Low-calorie drinks include water and unsweetened drinks.  The items listed above may not be a complete list of foods and beverages you can eat. Contact a dietitian for more information.  What foods should I limit?  Limit foods or drinks that are not good sources of vitamins, minerals, or protein or that are high in unhealthy fats. These include:  Candy.  Other sweets.  Sodas, specialty coffee drinks, alcohol, and juice.  The items listed above may not be a complete list of foods and beverages you should avoid. Contact a dietitian for more information.  How do I count calories when eating out?  Pay attention to portions. Often,  portions are much larger when eating out. Try these tips to keep portions smaller:  Consider sharing a meal instead of getting your own.  If you get your own meal, eat only half of it. Before you start eating, ask for a container and put half of your meal into it.  When available, consider ordering smaller portions from the menu instead of full portions.  Pay attention to your food and drink choices. Knowing the way food is cooked and what is included with the meal can help you eat fewer calories.  If calories are listed on the menu, choose the lower-calorie options.  Choose dishes that include vegetables, fruits, whole grains, low-fat dairy products, and lean proteins.  Choose items that are boiled, broiled, grilled, or steamed. Avoid items that are buttered, battered, fried, or served with cream sauce. Items labeled as crispy are usually fried, unless stated otherwise.  Choose water, low-fat milk, unsweetened iced tea, or other drinks without added sugar. If you want an alcoholic beverage, choose a lower-calorie option, such as a glass of wine or light beer.  Ask for dressings, sauces, and syrups on the side. These are usually high in calories, so you should limit the amount you eat.  If you want a salad, choose a garden salad and ask for grilled meats. Avoid extra toppings such as edward, cheese, or fried items. Ask for the dressing on the side, or ask for olive oil and vinegar or lemon to use as dressing.  Estimate how many servings of a food you are given. Knowing serving sizes will help you be aware of how much food you are eating at restaurants.  Where to find more information  Centers for Disease Control and Prevention: www.cdc.gov  [place] Department of Agriculture: myplate.gov  Summary  Calorie counting means keeping track of how many calories you eat and drink each day. If you eat fewer calories than your body needs, you should lose weight.  A healthy amount of weight to lose per week is usually 1-2 lb  (0.5-0.9 kg). This usually means reducing your daily calorie intake by 500-750 calories.  The number of calories in a food can be found on a Nutrition Facts label. If a food does not have a Nutrition Facts label, try to look up the calories online or ask your dietitian for help.  Use smaller plates, glasses, and bowls for smaller portions and to prevent overeating.  Use your calories on foods and drinks that will fill you up and not leave you hungry shortly after a meal.  This information is not intended to replace advice given to you by your health care provider. Make sure you discuss any questions you have with your health care provider.  Document Revised: 01/28/2021 Document Reviewed: 01/28/2021  [employer] Patient Education © 2023 [employer]

## 2023-12-12 NOTE — PROGRESS NOTES
Ear Cerumen Removal    Date/Time: 12/12/2023 5:17 PM    Performed by: Carlton Don MA  Authorized by: Yas Castillo MD    Anesthesia:  Local Anesthetic: none  Location details: left ear and right ear  Patient tolerance: patient tolerated the procedure well with no immediate complications  Procedure type: irrigation   Sedation:  Patient sedated: no

## 2023-12-19 ENCOUNTER — OFFICE VISIT (OUTPATIENT)
Dept: FAMILY MEDICINE CLINIC | Age: 68
End: 2023-12-19
Payer: MEDICARE

## 2023-12-19 VITALS
DIASTOLIC BLOOD PRESSURE: 69 MMHG | OXYGEN SATURATION: 97 % | BODY MASS INDEX: 30.33 KG/M2 | SYSTOLIC BLOOD PRESSURE: 141 MMHG | TEMPERATURE: 98.7 F | HEIGHT: 62 IN | WEIGHT: 164.8 LBS | HEART RATE: 60 BPM

## 2023-12-19 DIAGNOSIS — R05.2 SUBACUTE COUGH: Primary | ICD-10-CM

## 2023-12-19 DIAGNOSIS — I10 ESSENTIAL HYPERTENSION: ICD-10-CM

## 2023-12-19 DIAGNOSIS — E78.00 PURE HYPERCHOLESTEROLEMIA: ICD-10-CM

## 2023-12-19 RX ORDER — TRIAMCINOLONE ACETONIDE 40 MG/ML
40 INJECTION, SUSPENSION INTRA-ARTICULAR; INTRAMUSCULAR ONCE
Status: COMPLETED | OUTPATIENT
Start: 2023-12-19 | End: 2023-12-19

## 2023-12-19 RX ORDER — BENZONATATE 200 MG/1
200 CAPSULE ORAL 3 TIMES DAILY PRN
Qty: 30 CAPSULE | Refills: 0 | Status: SHIPPED | OUTPATIENT
Start: 2023-12-19 | End: 2023-12-29

## 2023-12-19 RX ADMIN — TRIAMCINOLONE ACETONIDE 40 MG: 40 INJECTION, SUSPENSION INTRA-ARTICULAR; INTRAMUSCULAR at 17:05

## 2023-12-19 NOTE — PROGRESS NOTES
"Chief Complaint  Cough (X 1 month  - camarena rx advair 12/12/23 but patient has not used yet)    Subjective          Senait Fong presents to [facility] for persistent cough. PCP has rx advair 12/12/23, but [facility] no longer takes , so she was not able to get medications until this am. Prior to this, she did have a viral uri. Symptoms have improved, except for cough. Does feel soa with coughing fits.     Objective   Vital Signs:   Vitals:    12/19/23 1631 12/19/23 1634   BP: 146/66 141/69   BP Location: Right arm Left arm   Patient Position: Sitting Sitting   Cuff Size: Adult Adult   Pulse: 62 60   Temp: 98.7 °F (37.1 °C)    TempSrc: Oral    SpO2: 97%    Weight: 74.8 kg (164 lb 12.8 oz)    Height: 156.8 cm (61.73\")        Physical Exam  Vitals reviewed.   Constitutional:       General: She is not in acute distress.     Appearance: Normal appearance. She is well-developed.   HENT:      Head: Normocephalic and atraumatic.   Eyes:      Conjunctiva/sclera: Conjunctivae normal.      Pupils: Pupils are equal, round, and reactive to light.   Cardiovascular:      Rate and Rhythm: Normal rate and regular rhythm.      Heart sounds: Normal heart sounds. No murmur heard.  Pulmonary:      Effort: Pulmonary effort is normal. No respiratory distress.      Breath sounds: Normal breath sounds.   Skin:     General: Skin is warm and dry.   Neurological:      Mental Status: She is alert and oriented to person, place, and time.   Psychiatric:         Mood and Affect: Mood and affect normal.         Behavior: Behavior normal.         Thought Content: Thought content normal.         Judgment: Judgment normal.          Result Review :              Assessment and Plan    Diagnoses and all orders for this visit:    1. Subacute cough (Primary)  Comments:  Start advair. Tessalon perles as needed. Go to ED with soa.  Orders:  -     triamcinolone acetonide (KENALOG-40) injection 40 mg    2. Essential " hypertension  Comments:  Slightly elevated. Check BP at home and f/u with pcp as scheduled. Goal < 130/80.    3. Pure hypercholesterolemia  Comments:  Pt just got atorvastatin this am and will be starting soon.    Other orders  -     benzonatate (TESSALON) 200 MG capsule; Take 1 capsule by mouth 3 (Three) Times a Day As Needed for Cough for up to 10 days.  Dispense: 30 capsule; Refill: 0    Pt v/u, agrees with plan of care and had no further questions upon d/c.     Follow Up    Return if symptoms worsen or fail to improve.  Patient was given instructions and counseling regarding her condition or for health maintenance advice. Please see specific information pulled into the AVS if appropriate.

## 2024-02-19 RX ORDER — CELECOXIB 200 MG/1
CAPSULE ORAL
Qty: 90 CAPSULE | Refills: 3 | Status: SHIPPED | OUTPATIENT
Start: 2024-02-19

## 2024-03-12 ENCOUNTER — LAB (OUTPATIENT)
Dept: LAB | Facility: HOSPITAL | Age: 69
End: 2024-03-12
Payer: MEDICARE

## 2024-03-12 ENCOUNTER — OFFICE VISIT (OUTPATIENT)
Dept: FAMILY MEDICINE CLINIC | Age: 69
End: 2024-03-12
Payer: MEDICARE

## 2024-03-12 VITALS
HEART RATE: 49 BPM | DIASTOLIC BLOOD PRESSURE: 73 MMHG | WEIGHT: 162.6 LBS | BODY MASS INDEX: 29.92 KG/M2 | OXYGEN SATURATION: 96 % | SYSTOLIC BLOOD PRESSURE: 124 MMHG | HEIGHT: 62 IN

## 2024-03-12 DIAGNOSIS — I10 ESSENTIAL HYPERTENSION: ICD-10-CM

## 2024-03-12 DIAGNOSIS — J30.1 ALLERGIC RHINITIS DUE TO POLLEN, UNSPECIFIED SEASONALITY: ICD-10-CM

## 2024-03-12 DIAGNOSIS — F33.0 MAJOR DEPRESSIVE DISORDER, RECURRENT, MILD: Primary | ICD-10-CM

## 2024-03-12 DIAGNOSIS — R00.1 BRADYCARDIA: ICD-10-CM

## 2024-03-12 DIAGNOSIS — E78.00 PURE HYPERCHOLESTEROLEMIA: ICD-10-CM

## 2024-03-12 LAB
ALBUMIN SERPL-MCNC: 4.6 G/DL (ref 3.5–5.2)
ALBUMIN/GLOB SERPL: 2.4 G/DL
ALP SERPL-CCNC: 54 U/L (ref 39–117)
ALT SERPL W P-5'-P-CCNC: 18 U/L (ref 1–33)
ANION GAP SERPL CALCULATED.3IONS-SCNC: 7 MMOL/L (ref 5–15)
AST SERPL-CCNC: 12 U/L (ref 1–32)
BILIRUB SERPL-MCNC: 0.5 MG/DL (ref 0–1.2)
BUN SERPL-MCNC: 17 MG/DL (ref 8–23)
BUN/CREAT SERPL: 22.7 (ref 7–25)
CALCIUM SPEC-SCNC: 10.2 MG/DL (ref 8.6–10.5)
CHLORIDE SERPL-SCNC: 102 MMOL/L (ref 98–107)
CHOLEST SERPL-MCNC: 283 MG/DL (ref 0–200)
CO2 SERPL-SCNC: 30 MMOL/L (ref 22–29)
CREAT SERPL-MCNC: 0.75 MG/DL (ref 0.57–1)
EGFRCR SERPLBLD CKD-EPI 2021: 85.8 ML/MIN/1.73
GLOBULIN UR ELPH-MCNC: 1.9 GM/DL
GLUCOSE SERPL-MCNC: 99 MG/DL (ref 65–99)
HDLC SERPL-MCNC: 75 MG/DL (ref 40–60)
LDLC SERPL CALC-MCNC: 187 MG/DL (ref 0–100)
LDLC/HDLC SERPL: 2.46 {RATIO}
POTASSIUM SERPL-SCNC: 4.4 MMOL/L (ref 3.5–5.2)
PROT SERPL-MCNC: 6.5 G/DL (ref 6–8.5)
SODIUM SERPL-SCNC: 139 MMOL/L (ref 136–145)
TRIGL SERPL-MCNC: 118 MG/DL (ref 0–150)
VLDLC SERPL-MCNC: 21 MG/DL (ref 5–40)

## 2024-03-12 PROCEDURE — 36415 COLL VENOUS BLD VENIPUNCTURE: CPT

## 2024-03-12 PROCEDURE — 80053 COMPREHEN METABOLIC PANEL: CPT

## 2024-03-12 PROCEDURE — 80061 LIPID PANEL: CPT

## 2024-03-12 RX ORDER — LISINOPRIL 10 MG/1
10 TABLET ORAL DAILY
Qty: 90 TABLET | Refills: 1 | Status: SHIPPED | OUTPATIENT
Start: 2024-03-12

## 2024-03-12 NOTE — PROGRESS NOTES
"Senait Fong presents to [facility].    Chief Complaint: cholesterol follow up    Subjective     History of Present Illness:  DepressionPatient is not experiencing: depressed mood, nervousness/anxiety, palpitations, shortness of breath and suicidal ideas.        Chronic allergies stable on loratadine     She is on celexa for depression/anxiety and she is doing great, happy, denies anxiety.  Is now working at [employer].She is sleeping great and is no longer on trazodone   Denies SI/HI.     She has eczema and her skin is clear of rash and doing well     She presents with hypercholesterolemia, cholesterol was 273, she didn't tolerate crestor or lipitor-causes body aches      Result Review   The following data was reviewed by Yas Castillo MD on 03/12/2024.  Lab Results   Component Value Date    WBC 4.70 12/11/2023    HGB 13.9 12/11/2023    HCT 41.5 12/11/2023    MCV 88.9 12/11/2023     12/11/2023     Lab Results   Component Value Date    GLUCOSE 105 (H) 12/11/2023    BUN 14 12/11/2023    CREATININE 0.68 12/11/2023    EGFR 94.4 12/11/2023    BCR 20.6 12/11/2023    K 4.3 12/11/2023    CO2 28.0 12/11/2023    CALCIUM 9.8 12/11/2023    ALBUMIN 4.7 12/11/2023    BILITOT 0.5 12/11/2023    AST 14 12/11/2023    ALT 15 12/11/2023     Lab Results   Component Value Date    CHOL 273 (H) 12/11/2023    CHLPL 134 10/29/2020    TRIG 138 12/11/2023    HDL 67 (H) 12/11/2023     (H) 12/11/2023     Lab Results   Component Value Date    TSH 1.750 11/23/2022     No results found for: \"HGBA1C\"  No results found for: \"PSA\"      No results found for: \"OYFG30PG\"            Assessment and Plan:   Diagnoses and all orders for this visit:    1. Major depression disorder (Primary)  Comments:  She is stable and well-controlled on Celexa.  Sleeping great at night.  Denies SI/HI    2. Essential hypertension  Comments:  Newly started on llisinopril last OV but she has not taken her meds today&BP " "is great.  Will stop lisinopril and have her monitor home BP-restart if BP > 140/90  Orders:  -     lisinopril (PRINIVIL,ZESTRIL) 10 MG tablet; Take 1 tablet by mouth Daily.  Dispense: 90 tablet; Refill: 1  -     Comprehensive Metabolic Panel; Future    3. Pure hypercholesterolemia  Comments:  Did not tolerate statins.  On low-cholesterol diet  Orders:  -     Lipid Panel; Future    4. Allergic rhinitis due to pollen, unspecified seasonality  Comments:  Stable and well-controlled on OTC Claritin..    5. Bradycardia  Comments:  No signs or symptoms of low heart rate.              Objective     Medications:  Current Outpatient Medications   Medication Instructions    celecoxib (CeleBREX) 200 MG capsule TAKE 1 CAPSULE DAILY    citalopram (CeleXA) 20 MG tablet TAKE 1 TABLET EVERY NIGHT AT BEDTIME    lisinopril (PRINIVIL,ZESTRIL) 10 mg, Oral, Daily    loratadine (CLARITIN) 10 mg, Oral, Daily    multivitamin with minerals tablet tablet 1 tablet, Oral, Daily        Vital Signs:   /73 (BP Location: Right arm, Patient Position: Sitting)   Pulse (!) 49   Ht 156.8 cm (61.73\")   Wt 73.8 kg (162 lb 9.6 oz)   SpO2 96%   BMI 30.00 kg/m²             Physical Exam:  Physical Exam  Vitals and nursing note reviewed.   Constitutional:       General: She is not in acute distress.     Appearance: Normal appearance. She is not ill-appearing, toxic-appearing or diaphoretic.   HENT:      Head: Normocephalic and atraumatic.      Right Ear: Tympanic membrane, ear canal and external ear normal.      Left Ear: Tympanic membrane, ear canal and external ear normal.      Nose: No congestion or rhinorrhea.      Mouth/Throat:      Mouth: Mucous membranes are moist.      Pharynx: Oropharynx is clear. No oropharyngeal exudate or posterior oropharyngeal erythema.   Eyes:      Extraocular Movements: Extraocular movements intact.      Conjunctiva/sclera: Conjunctivae normal.      Pupils: Pupils are equal, round, and reactive to light. "   Cardiovascular:      Rate and Rhythm: Normal rate and regular rhythm.      Heart sounds: Normal heart sounds.   Pulmonary:      Effort: Pulmonary effort is normal.      Breath sounds: Normal breath sounds. No wheezing, rhonchi or rales.   Abdominal:      General: Abdomen is flat.      Palpations: Abdomen is soft. There is no mass.      Tenderness: There is no abdominal tenderness.      Hernia: No hernia is present.   Musculoskeletal:      Cervical back: Neck supple. No rigidity.      Right lower leg: No edema.      Left lower leg: No edema.   Lymphadenopathy:      Cervical: No cervical adenopathy.   Skin:     General: Skin is warm and dry.   Neurological:      General: No focal deficit present.      Mental Status: She is alert and oriented to person, place, and time. Mental status is at baseline.   Psychiatric:         Mood and Affect: Mood normal.         Behavior: Behavior normal.         Thought Content: Thought content normal.         Judgment: Judgment normal.           Review of Systems:  Review of Systems   Constitutional:  Negative for chills, fatigue and fever.   HENT:  Negative for ear pain, sinus pressure and sore throat.    Eyes:  Negative for blurred vision and double vision.   Respiratory:  Negative for cough, shortness of breath and wheezing.    Cardiovascular:  Negative for chest pain and palpitations.   Gastrointestinal:  Negative for abdominal pain, blood in stool, constipation, diarrhea, nausea and vomiting.   Skin:  Negative for rash.   Neurological:  Negative for dizziness and headache.   Psychiatric/Behavioral:  Negative for sleep disturbance, suicidal ideas and depressed mood. The patient is not nervous/anxious.               Follow Up   Return in about 3 months (around 6/12/2024) for Recheck.    Part of this note may be an electronic transcription/translation of spoken language to printed   text using the Dragon Dictation System.            Medical History:  Medications Discontinued During  This Encounter   Medication Reason    atorvastatin (Lipitor) 10 MG tablet *Therapy completed    lisinopril (PRINIVIL,ZESTRIL) 10 MG tablet *Therapy completed    traZODone (DESYREL) 50 MG tablet *Therapy completed    Fluticasone-Salmeterol (ADVAIR/WIXELA) 100-50 MCG/ACT DISKUS *Therapy completed      Past Medical History:    Anxiety    Ongoing    Depression    Ongoing     No past surgical history on file.   Family History   Problem Relation Age of Onset    Alcohol abuse Father     Anxiety disorder Sister      Social History     Tobacco Use    Smoking status: Former     Average packs/day: 1 pack/day for 3.0 years (3.0 ttl pk-yrs)     Types: Cigarettes     Start date:      Quit date:      Years since quittin.2    Smokeless tobacco: Never   Substance Use Topics    Alcohol use: Yes       Health Maintenance Due   Topic Date Due    DXA SCAN  2023          There is no immunization history on file for this patient.    Allergies   Allergen Reactions    Codeine Nausea Only

## 2024-03-14 DIAGNOSIS — E78.00 PURE HYPERCHOLESTEROLEMIA: Primary | ICD-10-CM

## 2024-03-14 RX ORDER — EZETIMIBE 10 MG/1
10 TABLET ORAL DAILY
Qty: 90 TABLET | Refills: 0 | Status: SHIPPED | OUTPATIENT
Start: 2024-03-14

## 2024-03-22 ENCOUNTER — TELEPHONE (OUTPATIENT)
Dept: FAMILY MEDICINE CLINIC | Age: 69
End: 2024-03-22
Payer: MEDICARE

## 2024-05-07 DIAGNOSIS — I10 ESSENTIAL HYPERTENSION: ICD-10-CM

## 2024-05-07 DIAGNOSIS — F33.0 MAJOR DEPRESSIVE DISORDER, RECURRENT, MILD: ICD-10-CM

## 2024-05-07 RX ORDER — CITALOPRAM 20 MG/1
TABLET ORAL
Qty: 90 TABLET | Refills: 1 | Status: SHIPPED | OUTPATIENT
Start: 2024-05-07

## 2024-05-07 RX ORDER — LISINOPRIL 10 MG/1
10 TABLET ORAL DAILY
Qty: 90 TABLET | Refills: 3 | OUTPATIENT
Start: 2024-05-07

## 2024-05-21 DIAGNOSIS — E78.00 PURE HYPERCHOLESTEROLEMIA: ICD-10-CM

## 2024-05-21 RX ORDER — EZETIMIBE 10 MG/1
10 TABLET ORAL DAILY
Qty: 90 TABLET | Refills: 0 | Status: SHIPPED | OUTPATIENT
Start: 2024-05-21

## 2024-06-09 NOTE — PROGRESS NOTES
"  [facility]        NAME: July Valdez is a 68 y.o. female  : 1955    MRN: [id number]  DATE: 2024  TIME: 11:57 AM    Assessment and Plan   COVID-19 [U07.1]  1. COVID-19  benzonatate (TESSALON PERLES) 100 mg capsule    ondansetron (ZOFRAN) 4 mg tablet    fluticasone (FLONASE) 50 mcg/act nasal spray      2. Flu-like symptoms  Poct Covid 19 Rapid Antigen Test        Rapid COVID+ - unable to offer Paxlovid as no recent GFR in [employer] system. Advised patient to reach out to PCP regarding initiating antiviral medication. Tessalon perles, Zofran, and Flonase as directed for symptoms. Offered inhaler for acute episodes of DOMINGUEZ but patient declined as she feels \"it is not that bad.\" VSS in clinic, appears in no acute distress. Educated on use of OTC products for additional relief of symptoms. Advised close follow-up with PCP or to report to the ER if symptoms worsen. Patient verbalizes understanding and agreeable to plan.       Patient Instructions     Symptoms of a viral infection may linger for up to 10 days. Your contagious period is the first 5-7 days of symptom onset. Continue over-the-counter products for symptoms: tylenol for fevers, ibuprofen for body aches, flonase (fluticasone) with nasal saline for congestion, tessalon perles/robitussin/coricidin for cough, and airborne/emergen-c for vitamin supplementation. Take Zofran 15-20 minutes prior to eating as directed for acute episodes of nausea/vomiting.  Initiate BRAT (banana, rice, applesauce, toast) diet with clear liquids for next 24-48 hours. Give Zofran 15-20 minutes prior to feeding. May give tylenol/ibuprofen every 4-6 hours for pain and fever. Follow-up with PCP if no improvement of symptoms within 24-48 hours. Report to ER if symptoms worsen - develop difficulty breathing or unable to tolerate oral intake for greater than 24 hours.      Chief Complaint     Chief Complaint   Patient presents with    Cold Like Symptoms     Started 2 " Televideo visit with mingo, she understands this will be billed as a regular OV and MWE. hkm        The ABCs of the Annual Wellness Visit  Subsequent Medicare Wellness Visit    Chief Complaint   Patient presents with   • Anxiety     Med refills/ follow up [phone]      Subjective    History of Present Illness:  Senait Fong is a 67 y.o. female who presents for a Subsequent Medicare Wellness Visit.    The following portions of the patient's history were reviewed and   updated as appropriate: allergies, current medications, past family history, past medical history, past social history, past surgical history and problem list.    Compared to one year ago, the patient feels her physical   health is the same.    Compared to one year ago, the patient feels her mental   health is the same.    Recent Hospitalizations:  She was not admitted to the hospital during the last year.       Current Medical Providers:  Patient Care Team:  Yas Castillo MD as PCP - General (Family Medicine)    Outpatient Medications Prior to Visit   Medication Sig Dispense Refill   • loratadine (CLARITIN) 10 MG tablet Take 10 mg by mouth Daily.     • multivitamin with minerals tablet tablet Take 1 tablet by mouth Daily.     • citalopram (CeleXA) 20 MG tablet Take 1 tablet by mouth every night at bedtime. 90 tablet 0   • estradiol (ESTRACE) 0.1 MG/GM vaginal cream Insert 1 g into the vagina for one week then two times per week then once weekly. 85 g 0     No facility-administered medications prior to visit.       No opioid medication identified on active medication list. I have reviewed chart for other potential  high risk medication/s and harmful drug interactions in the elderly.          Aspirin is not on active medication list.  Aspirin use is not indicated based on review of current medical condition/s. Risk of harm outweighs potential benefits.  .    Patient Active Problem List   Diagnosis   • Encounter for annual wellness exam  days ago, patient complains of cough, congestion, fever, sore throat, vomiting, diarrhea. Positive home covid test.          History of Present Illness       68 year old female presents for evaluation of fever (tmax 101F), cough, congestion, and sore throat. She reports recently returning home from a bus trip to [place] on Wednesday and developing symptoms 2 days later. She reports mostly feeling fatigued and some occasional DOMINGUEZ with activity (noted when walking high) but denies current SOB. She denies h/o asthma but does have seasonal allergies. She relates last night she developed nausea/vomiting constantly throughout the night and has been unable to keep food/fluids down since 8 PM. She denies hematemesis or hematochezia. She did a home COVID test that was positive but is unsure if it was valid as it  in January. She is UTD on COVID, flu, and pneumonia vaccines. She is requesting a repeat test. She has tried delsym and an allergy pill (name unknown) with some relief.    URI   This is a new problem. The current episode started in the past 7 days. The problem has been unchanged. The maximum temperature recorded prior to her arrival was 101 - 101.9 F. The fever has been present for 1 to 2 days. Associated symptoms include abdominal pain, congestion, coughing, diarrhea, headaches, joint pain, rhinorrhea, a sore throat and vomiting. Pertinent negatives include no chest pain, dysuria, ear pain, joint swelling, nausea, neck pain, plugged ear sensation, rash, sinus pain, sneezing, swollen glands or wheezing. She has tried decongestant, increased fluids and sleep for the symptoms. The treatment provided no relief.       Review of Systems   Review of Systems   Constitutional:  Negative for activity change, appetite change, chills, fatigue and fever.   HENT:  Positive for congestion, postnasal drip, rhinorrhea, sinus pressure and sore throat. Negative for ear pain, sinus pain, sneezing and trouble swallowing.    Eyes:  in Medicare patient     Advance Care Planning  Advance Directive is not on file.  ACP discussion was held with the patient during this visit. Patient does not have an advance directive, information provided.    Review of Systems   Constitutional: Negative for chills, fatigue and fever.   HENT: Negative for ear pain, sinus pressure and sore throat.    Respiratory: Negative for cough, shortness of breath and wheezing.    Cardiovascular: Negative for chest pain, palpitations and leg swelling.   Gastrointestinal: Negative for abdominal pain, blood in stool, constipation, diarrhea, nausea and vomiting.   Genitourinary: Negative for dysuria.   Skin: Negative for rash.   Neurological: Negative for dizziness.   Psychiatric/Behavioral: Negative for sleep disturbance and suicidal ideas.        Objective    Vitals:    08/09/22 1112   Weight: 73 kg (161 lb)     Body mass index is 29.69 kg/m².  BMI has not been calculated during today's encounter.     Does the patient have evidence of cognitive impairment? No    Physical Exam  Vitals reviewed.   Constitutional:       General: She is not in acute distress.     Appearance: Normal appearance. She is normal weight. She is not ill-appearing.   HENT:      Head: Normocephalic and atraumatic.   Eyes:      Extraocular Movements: Extraocular movements intact.      Pupils: Pupils are equal, round, and reactive to light.   Pulmonary:      Effort: Pulmonary effort is normal.   Neurological:      General: No focal deficit present.      Mental Status: She is alert and oriented to person, place, and time.   Psychiatric:         Mood and Affect: Mood normal.         Behavior: Behavior normal.         Thought Content: Thought content normal.         Judgment: Judgment normal.                 HEALTH RISK ASSESSMENT    Smoking Status:  Social History     Tobacco Use   Smoking Status Never Smoker   Smokeless Tobacco Never Used     Alcohol Consumption:  Social History     Substance and Sexual   Negative for visual disturbance.   Respiratory:  Positive for cough. Negative for chest tightness, shortness of breath and wheezing.    Cardiovascular:  Negative for chest pain.   Gastrointestinal:  Positive for abdominal pain, diarrhea and vomiting. Negative for blood in stool, constipation and nausea.   Genitourinary:  Negative for decreased urine volume, difficulty urinating and dysuria.   Musculoskeletal:  Positive for joint pain and myalgias. Negative for arthralgias, back pain and neck pain.   Skin:  Negative for color change, pallor and rash.   Allergic/Immunologic: Negative for environmental allergies and food allergies.   Neurological:  Positive for headaches. Negative for dizziness and light-headedness.         Current Medications       Current Outpatient Medications:     aspirin-acetaminophen-caffeine (EXCEDRIN MIGRAINE) 250-250-65 MG per tablet, Take 1 tablet by mouth every 6 (six) hours as needed, Disp: , Rfl:     benzonatate (TESSALON PERLES) 100 mg capsule, Take 1 capsule (100 mg total) by mouth 3 (three) times a day as needed for cough, Disp: 20 capsule, Rfl: 0    Calcium Carbonate-Vitamin D 600-3.125 MG-MCG TABS, Take 600 mg by mouth, Disp: , Rfl:     CALCIUM PO, Take 500 mg by mouth 2 (two) times a day, Disp: , Rfl:     cholecalciferol (VITAMIN D3) 400 units tablet, Take by mouth daily, Disp: , Rfl:     co-enzyme Q-10 30 MG capsule, Take 30 mg by mouth 3 (three) times a day, Disp: , Rfl:     denosumab (PROLIA) 60 mg/mL, Inject 60 mg under the skin, Disp: , Rfl:     estradiol (ESTRACE) 0.1 mg/g vaginal cream, Insert 2 g into the vagina daily, Disp: , Rfl:     fluticasone (FLONASE) 50 mcg/act nasal spray, 1 spray into each nostril daily, Disp: 9.9 mL, Rfl: 0    Loratadine 10 MG CAPS, Take 10 mg by mouth, Disp: , Rfl:     MAGNESIUM PO, Take 250 mg by mouth, Disp: , Rfl:     niacin 250 MG tablet, Take 250 mg by mouth, Disp: , Rfl:     omeprazole (PriLOSEC OTC) 20 MG tablet, Take 20 mg by mouth daily,  Activity   Alcohol Use Yes     Fall Risk Screen:    ALEKSEYADI Fall Risk Assessment was completed, and patient is at LOW risk for falls.Assessment completed on:8/9/2022    Depression Screening:  PHQ-2/PHQ-9 Depression Screening 8/9/2022   Little Interest or Pleasure in Doing Things 0-->not at all   Feeling Down, Depressed or Hopeless 0-->not at all   PHQ-9: Brief Depression Severity Measure Score 0       Health Habits and Functional and Cognitive Screening:  Functional & Cognitive Status 8/9/2022   Do you have difficulty preparing food and eating? No   Do you have difficulty bathing yourself, getting dressed or grooming yourself? No   Do you have difficulty using the toilet? No   Do you have difficulty moving around from place to place? No   Do you have trouble with steps or getting out of a bed or a chair? No   Current Diet Well Balanced Diet   Dental Exam Up to date   Eye Exam Not up to date   Exercise (times per week) 7 times per week   Current Exercises Include Walking   Do you need help using the phone?  No   Are you deaf or do you have serious difficulty hearing?  Yes   Do you need help with transportation? No   Do you need help shopping? No   Do you need help preparing meals?  No   Do you need help with housework?  No   Do you need help with laundry? No   Do you need help taking your medications? No   Do you need help managing money? No   Do you ever drive or ride in a car without wearing a seat belt? No   Have you felt unusual stress, anger or loneliness in the last month? No   Who do you live with? Spouse   If you need help, do you have trouble finding someone available to you? No   Have you been bothered in the last four weeks by sexual problems? No   Do you have difficulty concentrating, remembering or making decisions? No       Age-appropriate Screening Schedule:  Refer to the list below for future screening recommendations based on patient's age, sex and/or medical conditions. Orders for these recommended  "Disp: , Rfl:     ondansetron (ZOFRAN) 4 mg tablet, Take 1 tablet (4 mg total) by mouth every 8 (eight) hours as needed for nausea or vomiting, Disp: 20 tablet, Rfl: 0    Red Yeast Rice 600 MG CAPS, Take by mouth, Disp: , Rfl:     Sodium Fluoride 5000 Sensitive 1.1-5 % GEL, , Disp: , Rfl:     thyroid (ARMOUR THYROID) 60 MG tablet, TAKE 1 TABLET DAILY, Disp: , Rfl:     thyroid (ARMOUR) 15 MG tablet, TAKE 1 TABLET EVERY OTHER DAY, Disp: , Rfl:     Current Allergies     Allergies as of 06/09/2024 - Reviewed 06/09/2024   Allergen Reaction Noted    Sulfa antibiotics Rash 07/11/2018    Gluten meal - food allergy Other (See Comments) 09/14/2020    Pollen extract Other (See Comments) 07/11/2018            The following portions of the patient's history were reviewed and updated as appropriate: allergies, current medications, past family history, past medical history, past social history, past surgical history and problem list.     Past Medical History:   Diagnosis Date    Hypothyroidism        Past Surgical History:   Procedure Laterality Date    INCONTINENCE SURGERY         Family History   Problem Relation Age of Onset    Esophageal cancer Father     Lung cancer Sister     Brain cancer Sister     Diabetes type I Brother          Medications have been verified.        Objective   /67   Pulse 74   Temp 98.8 °F (37.1 °C)   Resp 18   Ht 5' 3\" (1.6 m)   Wt 74.4 kg (164 lb)   SpO2 95%   BMI 29.05 kg/m²        Physical Exam     Physical Exam  Vitals and nursing note reviewed.   Constitutional:       General: She is awake. She is not in acute distress.     Appearance: Normal appearance. She is well-developed and normal weight.   HENT:      Head: Normocephalic and atraumatic.      Right Ear: Hearing, tympanic membrane, ear canal and external ear normal.      Left Ear: Hearing, tympanic membrane, ear canal and external ear normal.      Nose: Congestion and rhinorrhea present.      Mouth/Throat:      Lips: Pink.      " tests are listed in the plan section. The patient has been provided with a written plan.    Health Maintenance   Topic Date Due   • TDAP/TD VACCINES (1 - Tdap) Never done   • ZOSTER VACCINE (1 of 2) Never done   • INFLUENZA VACCINE  10/01/2022   • MAMMOGRAM  04/26/2023   • DXA SCAN  04/26/2023                The following data was reviewed by: Yas Castillo MD on 08/09/2022:                           Assessment & Plan   CMS Preventative Services Quick Reference  Risk Factors Identified During Encounter  Depression/Dysphoria  Hearing Problem  Immunizations Discussed/Encouraged (specific Immunizations; Tdap, Influenza, Prevnar 20 (Pneumococcal 20-valent conjugate), Shingrix and COVID19  Obesity/Overweight   The above risks/problems have been discussed with the patient.  Follow up actions/plans if indicated are seen below in the Assessment/Plan Section.  Pertinent information has been shared with the patient in the After Visit Summary.    Diagnoses and all orders for this visit:    1. Encounter for annual wellness exam in Medicare patient (Primary)  Assessment & Plan:  MEDICARE WELLNESS EXAM-SHE IS due for her MAMMOGRAM NOW, UTD ON DEXA, UTD ON COLONOSCOPY, UTD ON PAP AND PELVIC EXAM, SHE DEFERS VACCINATIONS INCLUDING:  SHINGLES VACCINE/PREVNAR 20/FLU AND COVID BOOSTER VACCINE, NO FALL RISK, NO MEMORY ISSUES, ANXIETY/ DEPRESSION ARE UNDER GOOD CONTROL, SHE LIVES WITH HER , SHE IS ABLE TO DRIVE AND PERFORM ADL'S/FINANCES INDEPENDENTLY, HEARING IS DECREASED-WILL CONSIDER TESTING WHEN SHE GETS BACK TO [place], COUNSELED ON LIVING WILL, CURRENT DOCTOR LIST UPDATED.  RECOMMEND YEARLY EYE EXAMS (OVERDUE) AND DENTAL EXAMS EVERY 6 MONTHS, RECOMMEND DIETICIAN REFERRAL FOR DIET COUNSELING WHEN SHE GETS BACK TO [place]   ADVANCED DIRECTIVES: None             2. Encounter for screening mammogram for breast cancer  -     Mammo Screening Digital Tomosynthesis Bilateral With CAD; Future    3. Encounter for immunization    4. Colon  Mouth: Mucous membranes are moist.      Pharynx: Oropharynx is clear. Uvula midline. Postnasal drip present. No oropharyngeal exudate or posterior oropharyngeal erythema.      Tonsils: No tonsillar exudate or tonsillar abscesses. 2+ on the right. 2+ on the left.   Eyes:      Conjunctiva/sclera: Conjunctivae normal.   Cardiovascular:      Rate and Rhythm: Normal rate and regular rhythm.      Pulses: Normal pulses.      Heart sounds: Normal heart sounds.   Pulmonary:      Effort: Pulmonary effort is normal.      Breath sounds: Normal breath sounds.   Abdominal:      Tenderness: There is abdominal tenderness. There is no right CVA tenderness, left CVA tenderness, guarding or rebound.      Comments: Lateral abdominal wall tenderness   Musculoskeletal:      Cervical back: Normal range of motion.   Skin:     General: Skin is warm and dry.   Neurological:      General: No focal deficit present.      Mental Status: She is alert and oriented to person, place, and time.   Psychiatric:         Mood and Affect: Mood normal.         Behavior: Behavior normal. Behavior is cooperative.         Thought Content: Thought content normal.         Judgment: Judgment normal.                    cancer screening  -     Ambulatory Referral For Screening Colonoscopy    5. Postmenopausal state  Comments:  TERRELL IS UTD    6. Major depressive disorder, recurrent, mild (HCC)  Comments:  overall stable and well controlled on celexa, she is happy and healthy  Orders:  -     citalopram (CeleXA) 20 MG tablet; Take 1 tablet by mouth every night at bedtime.  Dispense: 90 tablet; Refill: 1    7. Allergic rhinitis, unspecified seasonality, unspecified trigger  Comments:  stable on loratadine, more eczema    8. Eczema, unspecified type  Comments:   gentle soap moisturizing and daily lotion, avoid perfumes and dies.    9. Major depressive disorder, recurrent, mild (HCC)  -     citalopram (CeleXA) 20 MG tablet; Take 1 tablet by mouth every night at bedtime.  Dispense: 90 tablet; Refill: 1    10. Encounter for screening for cardiovascular disorders  -     Comprehensive Metabolic Panel; Future  -     Lipid Panel; Future    11. Encounter for screening for other viral diseases  -     Hepatitis C antibody; Future      Follow Up:   Return in about 6 months (around 2/9/2023) for Recheck.     An After Visit Summary and PPPS were made available to the patient.

## 2024-06-13 ENCOUNTER — HOSPITAL ENCOUNTER (OUTPATIENT)
Dept: GENERAL RADIOLOGY | Facility: HOSPITAL | Age: 69
Discharge: HOME | End: 2024-06-13
Payer: MEDICARE

## 2024-06-13 ENCOUNTER — OFFICE VISIT (OUTPATIENT)
Dept: FAMILY MEDICINE CLINIC | Age: 69
End: 2024-06-13
Payer: MEDICARE

## 2024-06-13 ENCOUNTER — LAB (OUTPATIENT)
Dept: LAB | Facility: HOSPITAL | Age: 69
End: 2024-06-13
Payer: MEDICARE

## 2024-06-13 VITALS
BODY MASS INDEX: 30.69 KG/M2 | WEIGHT: 166.8 LBS | SYSTOLIC BLOOD PRESSURE: 139 MMHG | HEIGHT: 62 IN | HEART RATE: 52 BPM | OXYGEN SATURATION: 96 % | DIASTOLIC BLOOD PRESSURE: 78 MMHG

## 2024-06-13 DIAGNOSIS — M54.2 NECK PAIN: ICD-10-CM

## 2024-06-13 DIAGNOSIS — E78.00 PURE HYPERCHOLESTEROLEMIA: ICD-10-CM

## 2024-06-13 DIAGNOSIS — F51.01 PRIMARY INSOMNIA: ICD-10-CM

## 2024-06-13 DIAGNOSIS — J30.1 ALLERGIC RHINITIS DUE TO POLLEN, UNSPECIFIED SEASONALITY: ICD-10-CM

## 2024-06-13 DIAGNOSIS — L30.9 ECZEMA, UNSPECIFIED TYPE: ICD-10-CM

## 2024-06-13 DIAGNOSIS — I10 ESSENTIAL HYPERTENSION: ICD-10-CM

## 2024-06-13 DIAGNOSIS — F33.0 MAJOR DEPRESSIVE DISORDER, RECURRENT, MILD: ICD-10-CM

## 2024-06-13 DIAGNOSIS — M54.2 NECK PAIN: Primary | ICD-10-CM

## 2024-06-13 LAB
ALBUMIN SERPL-MCNC: 4.7 G/DL (ref 3.5–5.2)
ALBUMIN/GLOB SERPL: 2.2 G/DL
ALP SERPL-CCNC: 55 U/L (ref 39–117)
ALT SERPL W P-5'-P-CCNC: 14 U/L (ref 1–33)
ANION GAP SERPL CALCULATED.3IONS-SCNC: 8 MMOL/L (ref 5–15)
AST SERPL-CCNC: 13 U/L (ref 1–32)
BILIRUB SERPL-MCNC: 0.4 MG/DL (ref 0–1.2)
BUN SERPL-MCNC: 21 MG/DL (ref 8–23)
BUN/CREAT SERPL: 28.8 (ref 7–25)
CALCIUM SPEC-SCNC: 9.8 MG/DL (ref 8.6–10.5)
CHLORIDE SERPL-SCNC: 102 MMOL/L (ref 98–107)
CHOLEST SERPL-MCNC: 244 MG/DL (ref 0–200)
CO2 SERPL-SCNC: 28 MMOL/L (ref 22–29)
CREAT SERPL-MCNC: 0.73 MG/DL (ref 0.57–1)
DEPRECATED RDW RBC AUTO: 39.3 FL (ref 37–54)
EGFRCR SERPLBLD CKD-EPI 2021: 88.6 ML/MIN/1.73
ERYTHROCYTE [DISTWIDTH] IN BLOOD BY AUTOMATED COUNT: 11.7 % (ref 12.3–15.4)
GLOBULIN UR ELPH-MCNC: 2.1 GM/DL
GLUCOSE SERPL-MCNC: 96 MG/DL (ref 65–99)
HCT VFR BLD AUTO: 44.3 % (ref 34–46.6)
HDLC SERPL-MCNC: 75 MG/DL (ref 40–60)
HGB BLD-MCNC: 14.6 G/DL (ref 12–15.9)
LDLC SERPL CALC-MCNC: 147 MG/DL (ref 0–100)
LDLC/HDLC SERPL: 1.91 {RATIO}
MCH RBC QN AUTO: 29.6 PG (ref 26.6–33)
MCHC RBC AUTO-ENTMCNC: 33 G/DL (ref 31.5–35.7)
MCV RBC AUTO: 89.9 FL (ref 79–97)
PLATELET # BLD AUTO: 253 10*3/MM3 (ref 140–450)
PMV BLD AUTO: 9.2 FL (ref 6–12)
POTASSIUM SERPL-SCNC: 4.1 MMOL/L (ref 3.5–5.2)
PROT SERPL-MCNC: 6.8 G/DL (ref 6–8.5)
RBC # BLD AUTO: 4.93 10*6/MM3 (ref 3.77–5.28)
SODIUM SERPL-SCNC: 138 MMOL/L (ref 136–145)
TRIGL SERPL-MCNC: 127 MG/DL (ref 0–150)
VLDLC SERPL-MCNC: 22 MG/DL (ref 5–40)
WBC NRBC COR # BLD AUTO: 6.33 10*3/MM3 (ref 3.4–10.8)

## 2024-06-13 PROCEDURE — 3078F DIAST BP <80 MM HG: CPT | Performed by: FAMILY MEDICINE

## 2024-06-13 PROCEDURE — 3075F SYST BP GE 130 - 139MM HG: CPT | Performed by: FAMILY MEDICINE

## 2024-06-13 PROCEDURE — 99214 OFFICE O/P EST MOD 30 MIN: CPT | Performed by: FAMILY MEDICINE

## 2024-06-13 PROCEDURE — 80053 COMPREHEN METABOLIC PANEL: CPT

## 2024-06-13 PROCEDURE — 80061 LIPID PANEL: CPT

## 2024-06-13 PROCEDURE — 72050 X-RAY EXAM NECK SPINE 4/5VWS: CPT

## 2024-06-13 PROCEDURE — 36415 COLL VENOUS BLD VENIPUNCTURE: CPT

## 2024-06-13 PROCEDURE — G2211 COMPLEX E/M VISIT ADD ON: HCPCS | Performed by: FAMILY MEDICINE

## 2024-06-13 PROCEDURE — 85027 COMPLETE CBC AUTOMATED: CPT

## 2024-06-13 RX ORDER — TRIAMCINOLONE ACETONIDE 1 MG/G
1 OINTMENT TOPICAL 2 TIMES DAILY
Qty: 15 G | Refills: 0 | Status: SHIPPED | OUTPATIENT
Start: 2024-06-13

## 2024-06-13 RX ORDER — RIBOFLAVIN (VITAMIN B2) 100 MG
100 TABLET ORAL DAILY
COMMUNITY

## 2024-06-13 NOTE — PROGRESS NOTES
"Senait Fong presents to [facility].    Chief Complaint: Neck pain    Subjective     History of Present Illness:  HPI    R neck pain, onset 3-4 months ago when she was doing a lot of heavy lifting, especially over the head  with 50 # boxes.  Sore around the left lateral neck to collar bone, aching, moderate severity, some shooting pain when she turns her head, usually worse in am.    She presents with chronic allergies stable on loratadine but her skin eczema is worse-angely on her lips.  She is currently using Chapstick routinely and is getting worse     She is on celexa for depression/anxiety and she is doing great, happy, denies anxiety.  Works at [employer].sleep is worse due to her neck pain.  Denies SI/HI.     She presents with hypercholesterolemia, controlled with diet, cholesterol was 283, she didn't tolerate crestor or lipitor-due to side effects of body aches    Result Review   The following data was reviewed by Yas Castillo MD on 06/13/2024.  Lab Results   Component Value Date    WBC 6.33 06/13/2024    HGB 14.6 06/13/2024    HCT 44.3 06/13/2024    MCV 89.9 06/13/2024     06/13/2024     Lab Results   Component Value Date    GLUCOSE 99 03/12/2024    BUN 17 03/12/2024    CREATININE 0.75 03/12/2024    EGFR 85.8 03/12/2024    BCR 22.7 03/12/2024    K 4.4 03/12/2024    CO2 30.0 (H) 03/12/2024    CALCIUM 10.2 03/12/2024    ALBUMIN 4.6 03/12/2024    BILITOT 0.5 03/12/2024    AST 12 03/12/2024    ALT 18 03/12/2024     Lab Results   Component Value Date    CHOL 283 (H) 03/12/2024    CHLPL 134 10/29/2020    TRIG 118 03/12/2024    HDL 75 (H) 03/12/2024     (H) 03/12/2024     Lab Results   Component Value Date    TSH 1.750 11/23/2022     No results found for: \"HGBA1C\"  No results found for: \"PSA\"      No results found for: \"BSTD96QY\"            Assessment and Plan:   Diagnoses and all orders for this visit:    1. Neck pain (Primary)  Comments:  Will x-ray C-spine " and get her set up with physical therapy to eval and treat  Orders:  -     XR Spine Cervical Complete 4 or 5 View; Future  -     Ambulatory Referral to Physical Therapy    2. Pure hypercholesterolemia  Comments:  Currently on Zetia, tolerates med well.  Will check labs and adjust Tx plan pending results.  Also continue low-cholesterol diet  Orders:  -     Lipid Panel; Future    3. Essential hypertension  Comments:  Borderline but stable and controlled with lisinopril, no changes in current Tx plan.  Recommend she avoid sodium in her diet  Orders:  -     Comprehensive Metabolic Panel; Future  -     CBC (No Diff); Future    4. Allergic rhinitis due to pollen, unspecified seasonality  Comments:  Stable on Claritin.  No changes needed current treatment plan    5. Major depressive disorder, recurrent, mild  Comments:  Stable and well-controlled on Celexa.  She tolerates med well.  No SI/HI.  She is not sleeping well due to pain but otherwise doing well    6. Eczema, unspecified type  Comments:  Will try her on topical triamcinolone cream, she is to follow-up if no improvement or worsening symptoms  Orders:  -     triamcinolone (KENALOG) 0.1 % ointment; Apply 1 Application topically to the appropriate area as directed 2 (Two) Times a Day.  Dispense: 15 g; Refill: 0    7. Primary insomnia  Comments:  Will treat neck pain first.  Recommend physical therapy              Objective     Medications:  Current Outpatient Medications   Medication Instructions    ascorbic acid (VITAMIN C) 100 mg, Oral, Daily    celecoxib (CeleBREX) 200 MG capsule TAKE 1 CAPSULE DAILY    citalopram (CeleXA) 20 MG tablet TAKE 1 TABLET EVERY NIGHT AT BEDTIME    ezetimibe (ZETIA) 10 mg, Oral, Daily    lisinopril (PRINIVIL,ZESTRIL) 10 mg, Oral, Daily    loratadine (CLARITIN) 10 mg, Oral, Daily    multivitamin with minerals tablet tablet 1 tablet, Oral, Daily    triamcinolone (KENALOG) 0.1 % ointment 1 Application, Topical, 2 Times Daily        Vital  "Signs:   /78 (BP Location: Right arm, Patient Position: Sitting)   Pulse 52   Ht 156.8 cm (61.73\")   Wt 75.7 kg (166 lb 12.8 oz)   SpO2 96%   BMI 30.77 kg/m²             Physical Exam:  Physical Exam  Vitals and nursing note reviewed.   Constitutional:       General: She is not in acute distress.     Appearance: Normal appearance. She is not ill-appearing, toxic-appearing or diaphoretic.   HENT:      Head: Normocephalic and atraumatic.      Right Ear: Tympanic membrane, ear canal and external ear normal.      Left Ear: Tympanic membrane, ear canal and external ear normal.      Nose: No congestion or rhinorrhea.      Mouth/Throat:      Mouth: Mucous membranes are moist.      Pharynx: Oropharynx is clear. No oropharyngeal exudate or posterior oropharyngeal erythema.   Eyes:      Extraocular Movements: Extraocular movements intact.      Conjunctiva/sclera: Conjunctivae normal.      Pupils: Pupils are equal, round, and reactive to light.   Cardiovascular:      Rate and Rhythm: Normal rate and regular rhythm.      Heart sounds: Normal heart sounds.   Pulmonary:      Effort: Pulmonary effort is normal.      Breath sounds: Normal breath sounds. No wheezing, rhonchi or rales.   Abdominal:      General: Abdomen is flat.      Palpations: Abdomen is soft. There is no mass.      Tenderness: There is no abdominal tenderness.      Hernia: No hernia is present.   Musculoskeletal:      Cervical back: Neck supple. Tenderness present. No swelling, rigidity or bony tenderness. Decreased range of motion.      Right lower leg: No edema.      Left lower leg: No edema.   Lymphadenopathy:      Cervical: No cervical adenopathy.   Skin:     General: Skin is warm and dry.   Neurological:      General: No focal deficit present.      Mental Status: She is alert and oriented to person, place, and time. Mental status is at baseline.   Psychiatric:         Mood and Affect: Mood normal.         Behavior: Behavior normal.         Thought " Content: Thought content normal.         Judgment: Judgment normal.           Review of Systems:  Review of Systems   Constitutional:  Negative for chills, fatigue and fever.   HENT:  Negative for congestion, ear pain, rhinorrhea, sinus pressure and sore throat.    Eyes:  Negative for blurred vision and visual disturbance.   Respiratory:  Negative for cough, shortness of breath and wheezing.    Cardiovascular:  Negative for chest pain and palpitations.   Gastrointestinal:  Negative for abdominal pain, constipation, diarrhea, nausea and GERD.   Endocrine: Negative for polyuria.   Genitourinary:  Negative for dysuria and hematuria.   Musculoskeletal:  Negative for arthralgias and myalgias.   Skin:  Negative for rash.   Neurological:  Negative for dizziness, weakness and headache.   Psychiatric/Behavioral:  Negative for depressed mood. The patient is not nervous/anxious.               Follow Up   No follow-ups on file.    Part of this note may be an electronic transcription/translation of spoken language to printed   text using the Dragon Dictation System.            Medical History:  There are no discontinued medications.   Past Medical History:    Anxiety    Ongoing    Depression    Ongoing     No past surgical history on file.   Family History   Problem Relation Age of Onset    Alcohol abuse Father     Anxiety disorder Sister      Social History     Tobacco Use    Smoking status: Former     Average packs/day: 1 pack/day for 3.0 years (3.0 ttl pk-yrs)     Types: Cigarettes     Start date:      Quit date: 2000     Years since quittin.4    Smokeless tobacco: Never   Substance Use Topics    Alcohol use: Yes       Health Maintenance Due   Topic Date Due    DXA SCAN  2023          There is no immunization history on file for this patient.    Allergies   Allergen Reactions    Codeine Nausea Only

## 2024-06-18 DIAGNOSIS — I10 ESSENTIAL HYPERTENSION: ICD-10-CM

## 2024-06-18 RX ORDER — LISINOPRIL 10 MG/1
10 TABLET ORAL DAILY
Qty: 90 TABLET | Refills: 0 | Status: SHIPPED | OUTPATIENT
Start: 2024-06-18

## 2024-06-18 RX ORDER — LISINOPRIL 10 MG/1
10 TABLET ORAL DAILY
Qty: 90 TABLET | Refills: 0 | Status: SHIPPED | OUTPATIENT
Start: 2024-06-18 | End: 2024-06-18 | Stop reason: SDUPTHER

## 2024-06-27 ENCOUNTER — PATIENT MESSAGE (OUTPATIENT)
Dept: FAMILY MEDICINE CLINIC | Age: 69
End: 2024-06-27
Payer: MEDICARE

## 2024-07-11 DIAGNOSIS — E78.00 PURE HYPERCHOLESTEROLEMIA: ICD-10-CM

## 2024-07-12 ENCOUNTER — TREATMENT (OUTPATIENT)
Dept: PHYSICAL THERAPY | Facility: CLINIC | Age: 69
End: 2024-07-12
Payer: MEDICARE

## 2024-07-12 DIAGNOSIS — R29.898 DECREASED ROM OF NECK: ICD-10-CM

## 2024-07-12 DIAGNOSIS — R29.3 POSTURE IMBALANCE: ICD-10-CM

## 2024-07-12 DIAGNOSIS — M54.2 PAIN, NECK: Primary | ICD-10-CM

## 2024-07-12 RX ORDER — EZETIMIBE 10 MG/1
10 TABLET ORAL DAILY
Qty: 90 TABLET | Refills: 0 | OUTPATIENT
Start: 2024-07-12

## 2024-07-12 NOTE — PROGRESS NOTES
Physical Therapy Initial Evaluation and Plan of Care      Patient: Senait Fong   : 1955  Diagnosis/ICD-10 Code:  Pain, neck [M54.2]  Referring practitioner: Yas Castillo MD  Date of Initial Visit: 2024  Today's Date: 2024  Patient seen for 1 sessions         Visit Diagnoses:    ICD-10-CM ICD-9-CM   1. Pain, neck  M54.2 723.1   2. Decreased ROM of neck  R29.898 723.8   3. Posture imbalance  R29.3 729.90         Subjective Evaluation    History of Present Illness  Mechanism of injury: Senait comes to [facility] with complaints of neck pain.  It has been bothering her for several months now. She started working at the [employer] about 6 months ago. She was moving heavy boxes (50 lbs give or take), overhead lifting, the neck and L side started bothering her.  She has since quit that job, but the pain is still there.  She is not getting radiating pain down the arm.  She gets pain down the L side of the neck and into the collarbone region.   Feels pain more with movement, worse in the morning.     She had recliner that she just bought, but it pushes her forwards at the back of the head and she cannot sit in it.    Increased headaches        Patient Occupation: [employer]  Current pain ratin  Quality: dull ache (shooting)  Alleviating factors: salonpos, CBD pain relief ball she rolls on.  Aggravating factors: sleeping and movement    Social Support  Lives with: spouse    Hand dominance: right    Treatments  Previous treatment: physical therapy  Patient Goals  Patient goals for therapy: decreased pain, increased motion, increased strength, independence with ADLs/IADLs and return to sport/leisure activities             Objective           General Comments     Cervical/Thoracic Comments  Cervical AROM:  Flexion: 15 degrees, pain in the L cervical region  Extension: 15 degrees, pain in the L collarbone  RSB: 15 degrees  LSB: 12 degrees  R rotation: 60 degrees  L rotation: 45  degrees    Radiculopathy is not present down the B upper extremity.    Special tests:  Distraction: relief noted    Posture: forwards flexed, L upper trap           Assessment & Plan       Assessment  Impairments: abnormal or restricted ROM, activity intolerance, impaired physical strength, lacks appropriate home exercise program and pain with function   Assessment details: Pt presents with limitations, noted below, that impede her ability to reach overhead, heavy lifting, headaches, driving, and sleeping. The skills of a therapist will be required to safely and effectively implement the following treatment plan to restore maximal level of function.        Goals  Plan Goals: CERVICAL PROBLEMS    1. The patient has limited ROM.    LTG 1: 12 weeks:  The patient will demonstrate 80° of bilateral cervical spine rotation in order to adequately monitor blind spots while driving and improve ability to perform activities of daily living.    STATUS:  New  STG 1a: 4 weeks:  The patient will demonstrate 60° of bilateral cervical spine rotation, in order to adequately monitor blind spots while driving and improve ability to perform activities of daily living.       STATUS:  New   TREATMENT:  Manual therapy, therapeutic exercise, home exercise instruction, cervical traction, and modalities as needed to include: moist heat, electrical stimulation, and ultrasound.     2. The patient has complaints of pain.   LTG 2: 12 weeks:  The patient will report 1/10 pain in order to more easily tolerate activities of daily living and improve sleep quality.    STATUS:  New   STG 2a: 4 weeks:  The patient will report 2/10 pain.    STATUS:  New  STG2b:  4 weeks:  The patient will be independent with home exercises.     STATUS:  New   TREATMENT: Manual therapy, therapeutic exercise, home exercise instruction, cervical traction, and modalities as needed to include: moist heat, electrical stimulation, and ultrasound.    3. Carrying, Moving, and  Handling Objects Functional Limitation     LTG 3: 12 weeks:  The patient will demonstrate limitation by achieving a score of 4/50 on the Neck Disability Index.    STATUS:  New   STG 3a: 4 weeks:  The patient will demonstrate limitation by achieving a score of 8/50 on the Neck Disability Index.      STATUS:  New        Plan  Therapy options: will be seen for skilled therapy services  Planned modality interventions: cryotherapy, dry needling, TENS, thermotherapy (hydrocollator packs), ultrasound and traction  Planned therapy interventions: flexibility, functional ROM exercises, home exercise program, manual therapy, neuromuscular re-education, postural training, soft tissue mobilization, spinal/joint mobilization, strengthening, stretching, therapeutic activities, balance/weight-bearing training, ADL retraining, body mechanics training and joint mobilization  Frequency: 2x week  Duration in weeks: 12  Treatment plan discussed with: patient  Plan details: Review HEP, update as needed.  Continue with cervical and pec manual work.     Progress with cervical and scapular strength, trunk control/postural awareness, increased stamina, decreased tightness, improved ROM/flexibility, decrease trigger points, education as needed.            History # of Personal Factors and/or Comorbidities: MODERATE (1-2)  Examination of Body System(s): # of elements: LOW (1-2)  Clinical Presentation: STABLE   Clinical Decision Making: LOW     Timed:  Manual Therapy:    14     mins  80121;  Therapeutic Exercise:    9     mins  89978;     Neuromuscular Park:        mins  52173;    Therapeutic Activity:          mins  25217;     Gait Training:           mins  66810;     Ultrasound:          mins  04311;    Canalith Repos           ___  mins  35139      Untimed:  Electrical Stimulation:         mins  42924 ( );  Mechanical Traction:        mins  62985;   Dry Needling:                     mins self pay  Low Eval:                      20      mins  20693;  Medium Eval:                     mins  46820;   High Eval:                          mins  53482       Timed Treatment:   23   mins   Total Treatment:     43   mins    PT SIGNATURE: Joanne Conner PT, DPT  [place] License: [id number]  Electronically signed by Joanne Conner PT, 07/12/24, 7:48 AM EDT      Initial Certification    Certification Period: 7/12/2024 thru 10/9/2024  I certify that the therapy services are furnished while this patient is under my care.  The services outlined above are required by this patient, and will be reviewed every 90 days.     PHYSICIAN: Yas Castillo MD  NPI: [id number]            PHYSICIAN PRINT NAME: ______________________________________________      PHYSICIAN SIGNATURE: ______________________________________________         DATE:________________________________        Please sign and return via fax to [phone].  Thank you, [facility].

## 2024-07-16 ENCOUNTER — TREATMENT (OUTPATIENT)
Dept: PHYSICAL THERAPY | Facility: CLINIC | Age: 69
End: 2024-07-16
Payer: MEDICARE

## 2024-07-16 DIAGNOSIS — R29.3 POSTURE IMBALANCE: ICD-10-CM

## 2024-07-16 DIAGNOSIS — R29.898 DECREASED ROM OF NECK: ICD-10-CM

## 2024-07-16 DIAGNOSIS — M54.2 PAIN, NECK: Primary | ICD-10-CM

## 2024-07-16 NOTE — PROGRESS NOTES
Physical Therapy Daily Treatment Note      Patient: Senait Fong   : 1955  Referring practitioner: Yas Castillo MD  Date of Initial Visit: Type: THERAPY  Noted: 2024  Today's Date: 2024  Patient seen for 2 sessions           Subjective Evaluation    History of Present Illness    Subjective comment: 3/10 in the cervical region.       Objective   See Exercise, Manual, and Modality Logs for complete treatment.       Assessment & Plan       Assessment  Impairments: abnormal or restricted ROM, activity intolerance, impaired physical strength, lacks appropriate home exercise program and pain with function   Assessment details: Pt required vc and demonstration of all exercises due to this being her first tx visit. Pt is performing her HEP. There is tightness in the neck at several regions. There were a few good releases with manual at this visit. All tx was tolerated without any issue.    Goals  Plan Goals: CERVICAL PROBLEMS    1. The patient has limited ROM.    LTG 1: 12 weeks:  The patient will demonstrate 80° of bilateral cervical spine rotation in order to adequately monitor blind spots while driving and improve ability to perform activities of daily living.    STATUS:  progressing  STG 1a: 4 weeks:  The patient will demonstrate 60° of bilateral cervical spine rotation, in order to adequately monitor blind spots while driving and improve ability to perform activities of daily living.       STATUS:  progressing   TREATMENT:  Manual therapy, therapeutic exercise, home exercise instruction, cervical traction, and modalities as needed to include: moist heat, electrical stimulation, and ultrasound.     2. The patient has complaints of pain.   LTG 2: 12 weeks:  The patient will report 1/10 pain in order to more easily tolerate activities of daily living and improve sleep quality.    STATUS:  progressing   STG 2a: 4 weeks:  The patient will report 2/10 pain.    STATUS:  progressing  STG2b:  4 weeks:   The patient will be independent with home exercises.     STATUS:  progressing   TREATMENT: Manual therapy, therapeutic exercise, home exercise instruction, cervical traction, and modalities as needed to include: moist heat, electrical stimulation, and ultrasound.    3. Carrying, Moving, and Handling Objects Functional Limitation     LTG 3: 12 weeks:  The patient will demonstrate limitation by achieving a score of 4/50 on the Neck Disability Index.    STATUS:  progressing   STG 3a: 4 weeks:  The patient will demonstrate limitation by achieving a score of 8/50 on the Neck Disability Index.      STATUS:  progressing        Plan  Therapy options: will be seen for skilled therapy services  Planned modality interventions: cryotherapy, dry needling, TENS, thermotherapy (hydrocollator packs), ultrasound and traction  Planned therapy interventions: flexibility, functional ROM exercises, home exercise program, manual therapy, neuromuscular re-education, postural training, soft tissue mobilization, spinal/joint mobilization, strengthening, stretching, therapeutic activities, balance/weight-bearing training, ADL retraining, body mechanics training and joint mobilization  Frequency: 2x week  Duration in weeks: 12  Treatment plan discussed with: patient  Plan details:             Visit Diagnoses:    ICD-10-CM ICD-9-CM   1. Pain, neck  M54.2 723.1   2. Decreased ROM of neck  R29.898 723.8   3. Posture imbalance  R29.3 729.90       Progress per Plan of Care    Timed:    Therapeutic Exercise:    15     mins  70099;  Manual Therapy:    17     mins  31784;     Neuromuscular Park:       mins  93318;    Therapeutic Activity:     9     mins  47859;     Gait Training:           mins  64569;     Ultrasound:          mins  45803;      Untimed:  Electrical Stimulation:         mins  67133 ( );  Mechanical Traction:         mins  83030;     Timed Treatment:   41   mins   Total Treatment:     44   mins      Sahara Lu PTA  Physical  Therapist Assistant

## 2024-07-23 ENCOUNTER — TREATMENT (OUTPATIENT)
Dept: PHYSICAL THERAPY | Facility: CLINIC | Age: 69
End: 2024-07-23
Payer: MEDICARE

## 2024-07-23 DIAGNOSIS — M54.2 PAIN, NECK: Primary | ICD-10-CM

## 2024-07-23 DIAGNOSIS — R29.3 POSTURE IMBALANCE: ICD-10-CM

## 2024-07-23 DIAGNOSIS — R29.898 DECREASED ROM OF NECK: ICD-10-CM

## 2024-07-23 PROCEDURE — 97140 MANUAL THERAPY 1/> REGIONS: CPT | Performed by: PHYSICAL THERAPIST

## 2024-07-23 PROCEDURE — 97110 THERAPEUTIC EXERCISES: CPT | Performed by: PHYSICAL THERAPIST

## 2024-07-23 NOTE — PROGRESS NOTES
Physical Therapy Daily Treatment Note      Patient: Senait Fong   : 1955  Referring practitioner: Yas Castillo MD  Date of Initial Visit: Type: THERAPY  Noted: 2024  Today's Date: 2024  Patient seen for 3 sessions           Subjective Evaluation    History of Present Illness    Subjective comment: 3/10 in the neck       Objective   See Exercise, Manual, and Modality Logs for complete treatment.       Assessment & Plan       Assessment  Impairments: abnormal or restricted ROM, activity intolerance, impaired physical strength, lacks appropriate home exercise program and pain with function   Assessment details: Pt cont to report getting better with less pain in the collarbone and up to the SHLD. There cont to be tightness throughout the cervical, SHLDs and pec regions at this visit. Focused on strength and decreasing tightness at this visit.     Goals  Plan Goals: CERVICAL PROBLEMS    1. The patient has limited ROM.    LTG 1: 12 weeks:  The patient will demonstrate 80° of bilateral cervical spine rotation in order to adequately monitor blind spots while driving and improve ability to perform activities of daily living.    STATUS:  progressing  STG 1a: 4 weeks:  The patient will demonstrate 60° of bilateral cervical spine rotation, in order to adequately monitor blind spots while driving and improve ability to perform activities of daily living.       STATUS:  progressing   TREATMENT:  Manual therapy, therapeutic exercise, home exercise instruction, cervical traction, and modalities as needed to include: moist heat, electrical stimulation, and ultrasound.     2. The patient has complaints of pain.   LTG 2: 12 weeks:  The patient will report 1/10 pain in order to more easily tolerate activities of daily living and improve sleep quality.    STATUS:  progressing   STG 2a: 4 weeks:  The patient will report 2/10 pain.    STATUS:  progressing  STG2b:  4 weeks:  The patient will be independent with  home exercises.     STATUS:  progressing   TREATMENT: Manual therapy, therapeutic exercise, home exercise instruction, cervical traction, and modalities as needed to include: moist heat, electrical stimulation, and ultrasound.    3. Carrying, Moving, and Handling Objects Functional Limitation     LTG 3: 12 weeks:  The patient will demonstrate limitation by achieving a score of 4/50 on the Neck Disability Index.    STATUS:  progressing   STG 3a: 4 weeks:  The patient will demonstrate limitation by achieving a score of 8/50 on the Neck Disability Index.      STATUS:  progressing        Plan  Therapy options: will be seen for skilled therapy services  Planned modality interventions: cryotherapy, dry needling, TENS, thermotherapy (hydrocollator packs), ultrasound and traction  Planned therapy interventions: flexibility, functional ROM exercises, home exercise program, manual therapy, neuromuscular re-education, postural training, soft tissue mobilization, spinal/joint mobilization, strengthening, stretching, therapeutic activities, balance/weight-bearing training, ADL retraining, body mechanics training and joint mobilization  Frequency: 2x week  Duration in weeks: 12  Treatment plan discussed with: patient  Plan details:             Visit Diagnoses:    ICD-10-CM ICD-9-CM   1. Pain, neck  M54.2 723.1   2. Decreased ROM of neck  R29.898 723.8   3. Posture imbalance  R29.3 729.90       Progress per Plan of Care    Timed:    Therapeutic Exercise:    12     mins  77555;  Manual Therapy:    17     mins  90634;     Neuromuscular Park:       mins  59670;    Therapeutic Activity:          mins  12515;     Gait Training:           mins  01351;     Ultrasound:          mins  09291;      Untimed:  Electrical Stimulation:         mins  95197 ( );  Mechanical Traction:         mins  90991;     Timed Treatment:   29   mins   Total Treatment:     31   mins      Sahara Lu PTA  Physical Therapist Assistant

## 2024-07-30 ENCOUNTER — TREATMENT (OUTPATIENT)
Dept: PHYSICAL THERAPY | Facility: CLINIC | Age: 69
End: 2024-07-30
Payer: MEDICARE

## 2024-07-30 DIAGNOSIS — R29.898 DECREASED ROM OF NECK: ICD-10-CM

## 2024-07-30 DIAGNOSIS — M54.2 PAIN, NECK: Primary | ICD-10-CM

## 2024-07-30 DIAGNOSIS — R29.3 POSTURE IMBALANCE: ICD-10-CM

## 2024-08-06 ENCOUNTER — TREATMENT (OUTPATIENT)
Dept: PHYSICAL THERAPY | Facility: CLINIC | Age: 69
End: 2024-08-06
Payer: MEDICARE

## 2024-08-06 DIAGNOSIS — R29.3 POSTURE IMBALANCE: ICD-10-CM

## 2024-08-06 DIAGNOSIS — M54.2 PAIN, NECK: Primary | ICD-10-CM

## 2024-08-06 DIAGNOSIS — R29.898 DECREASED ROM OF NECK: ICD-10-CM

## 2024-08-06 NOTE — PROGRESS NOTES
Physical Therapy Daily Treatment Note    Patient: Senait Fong   : 1955  Diagnosis/ICD-10 Code:  Pain, neck [M54.2]  Referring practitioner: Yas Castillo MD  Date of Initial Visit: Type: THERAPY  Noted: 2024  Today's Date: 2024  Patient seen for 5 sessions             Subjective Evaluation    History of Present Illness    Subjective comment: Patient reports 3/10 pain located in the left UT region.        Objective     See Exercise, Manual, and Modality Logs for complete treatment.     Assessment & Plan       Assessment  Impairments: abnormal or restricted ROM, activity intolerance, impaired physical strength, lacks appropriate home exercise program and pain with function   Assessment details: Patient was challenged by no monies. No complaints of pain with exercises, just fatigue. Patient was very tight on the left side and required some cuing with exercises to avoid compensation. Pt would benefit from skilled PT to address Range of Motion  and Strength deficits, pain management and any concerns with ADLs.     Goals  Plan Goals: CERVICAL PROBLEMS    1. The patient has limited ROM.    LTG 1: 12 weeks:  The patient will demonstrate 80° of bilateral cervical spine rotation in order to adequately monitor blind spots while driving and improve ability to perform activities of daily living.    STATUS:  progressing  STG 1a: 4 weeks:  The patient will demonstrate 60° of bilateral cervical spine rotation, in order to adequately monitor blind spots while driving and improve ability to perform activities of daily living.       STATUS:  progressing   TREATMENT:  Manual therapy, therapeutic exercise, home exercise instruction, cervical traction, and modalities as needed to include: moist heat, electrical stimulation, and ultrasound.     2. The patient has complaints of pain.   LTG 2: 12 weeks:  The patient will report 1/10 pain in order to more easily tolerate activities of daily living and improve sleep  quality.    STATUS:  progressing   STG 2a: 4 weeks:  The patient will report 2/10 pain.    STATUS:  progressing  STG2b:  4 weeks:  The patient will be independent with home exercises.     STATUS:  progressing   TREATMENT: Manual therapy, therapeutic exercise, home exercise instruction, cervical traction, and modalities as needed to include: moist heat, electrical stimulation, and ultrasound.    3. Carrying, Moving, and Handling Objects Functional Limitation     LTG 3: 12 weeks:  The patient will demonstrate limitation by achieving a score of 4/50 on the Neck Disability Index.    STATUS:  progressing   STG 3a: 4 weeks:  The patient will demonstrate limitation by achieving a score of 8/50 on the Neck Disability Index.      STATUS:  progressing        Plan  Therapy options: will be seen for skilled therapy services  Planned modality interventions: cryotherapy, dry needling, TENS, thermotherapy (hydrocollator packs), ultrasound and traction  Planned therapy interventions: flexibility, functional ROM exercises, home exercise program, manual therapy, neuromuscular re-education, postural training, soft tissue mobilization, spinal/joint mobilization, strengthening, stretching, therapeutic activities, balance/weight-bearing training, ADL retraining, body mechanics training and joint mobilization  Frequency: 2x week  Duration in weeks: 12  Treatment plan discussed with: patient  Plan details:         Progress per Plan of Care      Timed:  Manual Therapy:    15     mins  05960;  Therapeutic Exercise:    15     mins  74100;     Neuromuscular Park:    0    mins  95001;    Therapeutic Activity:     0     mins  76953;     Gait Trainin     mins  25105;    Aquatic Therapy:     0     mins  06919;       Untimed:  Electrical Stimulation:    0     mins  68647 ( );  Mechanical Traction:    0     mins  87543;       Timed Treatment:   30   mins   Total Treatment:     30   mins      Electronically signed:   Lydia Fox  RUPA  Physical Therapist Assistant  [facility] License #: [id number]

## 2024-08-13 ENCOUNTER — TREATMENT (OUTPATIENT)
Dept: PHYSICAL THERAPY | Facility: CLINIC | Age: 69
End: 2024-08-13
Payer: MEDICARE

## 2024-08-13 DIAGNOSIS — R29.3 POSTURE IMBALANCE: ICD-10-CM

## 2024-08-13 DIAGNOSIS — R29.898 DECREASED ROM OF NECK: ICD-10-CM

## 2024-08-13 DIAGNOSIS — M54.2 PAIN, NECK: Primary | ICD-10-CM

## 2024-08-13 NOTE — PROGRESS NOTES
Physical Therapy Progress Note          [address]  [address]  [place] [address]  [phone]  8/13/2024  Yas Mueller MD    Re: Senait Fong  1955  Yas Mueller Md  [address]  [address]  [place] [address]   ________________________________________________________________    Ms. Senait Fong, has attended 6 PT sessions.  Treatment has consisted of: manual, there-ex/activities, traction, HEP and pt education     S: Ms. Senait Fong states: Was doing better since started PT but then put a foam topper back on my mattress and that made it worse.  I haven't been doing my exercises. Denies UE sxs. The pain in the neck is there with movement. Pain about 2/10.      Senait Fong reports: NDI 5      Objective           General Comments     Cervical/Thoracic Comments  Cervical AROM:  Flexion: 38 degrees, pain in the L cervical region  Extension: 30 degrees, no pain in the L collarbone  RSB: 34 degrees  LSB: 33 degrees  R rotation: 58 degrees  L rotation: 57 degrees    Radiculopathy is not present down the B upper extremity.    Special tests:  Distraction: relief noted    Posture: forward flexed, TTP SCM origin and  increased tone B UT     See Exercise, Manual, and Modality Logs for complete treatment.       Assessment/Plan  Overall improvement with increased ROM and function and decreased pain but still with painful movement and activities.  Needs improved compliance with HEP to achieve more lasting relief and benefit.  Good response to manual intervention.    Plan Goals: CERVICAL PROBLEMS     1. The patient has limited ROM.                       LTG 1: 12 weeks:  The patient will demonstrate 80° of bilateral cervical spine rotation in order to adequately monitor blind spots while driving and improve ability to perform activities of daily living.                          STATUS:  Progressing  STG 1a: 4 weeks:  The patient will demonstrate 60° of bilateral cervical spine  rotation, in order to adequately monitor blind spots while driving and improve ability to perform activities of daily living.                                      STATUS:  Progressing              TREATMENT:  Manual therapy, therapeutic exercise, home exercise instruction, cervical traction, and modalities as needed to include: moist heat, electrical stimulation, and ultrasound.                2. The patient has complaints of pain.              LTG 2: 12 weeks:  The patient will report 1/10 pain in order to more easily tolerate activities of daily living and improve sleep quality.                          STATUS:  Progressing              STG 2a: 4 weeks:  The patient will report 2/10 pain.                          STATUS:  MET  STG2b:  4 weeks:  The patient will be independent with home exercises.                           STATUS:  Not consistent              TREATMENT: Manual therapy, therapeutic exercise, home exercise instruction, cervical traction, and modalities as needed to include: moist heat, electrical stimulation, and ultrasound.     3. Carrying, Moving, and Handling Objects Functional Limitation                               LTG 3: 12 weeks:  The patient will demonstrate limitation by achieving a score of 4/50 on the Neck Disability Index.                          STATUS:  Progressing              STG 3a: 4 weeks:  The patient will demonstrate limitation by achieving a score of 8/50 on the Neck Disability Index.                            STATUS:  MET        Progress strengthening /stabilization /functional activity             Manual Therapy:    8     mins  36189;  Therapeutic Exercise:    10     mins  49855;     Neuromuscular Park:    0    mins  28718;    Therapeutic Activity:     14     mins  83433;     Gait Trainin     mins  45057;     Ultrasound:     0     mins  56373;    Work Hardening           0      mins 83999  E-stim                0   mins 20127    Timed Treatment:   32   mins   Total  Treatment:     32   mins    India Valenzuela, PT  Physical Therapist  [place] #[id number]

## 2024-08-15 DIAGNOSIS — E78.00 PURE HYPERCHOLESTEROLEMIA: ICD-10-CM

## 2024-08-16 RX ORDER — EZETIMIBE 10 MG/1
10 TABLET ORAL DAILY
Qty: 90 TABLET | Refills: 0 | Status: SHIPPED | OUTPATIENT
Start: 2024-08-16

## 2024-08-27 ENCOUNTER — TREATMENT (OUTPATIENT)
Dept: PHYSICAL THERAPY | Facility: CLINIC | Age: 69
End: 2024-08-27
Payer: MEDICARE

## 2024-08-27 DIAGNOSIS — R29.3 POSTURE IMBALANCE: ICD-10-CM

## 2024-08-27 DIAGNOSIS — M54.2 PAIN, NECK: Primary | ICD-10-CM

## 2024-08-27 DIAGNOSIS — R29.898 DECREASED ROM OF NECK: ICD-10-CM

## 2024-08-27 NOTE — PROGRESS NOTES
Physical Therapy Daily Treatment Note      Patient: Senait Fong   : 1955  Referring practitioner: Yas Castillo MD  Date of Initial Visit: Type: THERAPY  Noted: 2024  Today's Date: 2024  Patient seen for 7 sessions           Subjective Evaluation    History of Present Illness    Subjective comment: 2/10 Pt reports not feeling any pain when she doesn't move.       Objective   See Exercise, Manual, and Modality Logs for complete treatment.       Assessment & Plan       Assessment  Impairments: abnormal or restricted ROM, activity intolerance, impaired physical strength, lacks appropriate home exercise program and pain with function   Assessment details: Patient has low pain today at tx time. Focus of this tx visit was strengthening and stabilization of the cervical region. Pt was tolerance of all activity.    Goals  Plan Goals: CERVICAL PROBLEMS    1. The patient has limited ROM.    LTG 1: 12 weeks:  The patient will demonstrate 80° of bilateral cervical spine rotation in order to adequately monitor blind spots while driving and improve ability to perform activities of daily living.    STATUS:  progressing  STG 1a: 4 weeks:  The patient will demonstrate 60° of bilateral cervical spine rotation, in order to adequately monitor blind spots while driving and improve ability to perform activities of daily living.       STATUS:  progressing   TREATMENT:  Manual therapy, therapeutic exercise, home exercise instruction, cervical traction, and modalities as needed to include: moist heat, electrical stimulation, and ultrasound.     2. The patient has complaints of pain.   LTG 2: 12 weeks:  The patient will report 1/10 pain in order to more easily tolerate activities of daily living and improve sleep quality.    STATUS:  progressing   STG 2a: 4 weeks:  The patient will report 2/10 pain.    STATUS:  progressing  STG2b:  4 weeks:  The patient will be independent with home exercises.     STATUS:   progressing   TREATMENT: Manual therapy, therapeutic exercise, home exercise instruction, cervical traction, and modalities as needed to include: moist heat, electrical stimulation, and ultrasound.    3. Carrying, Moving, and Handling Objects Functional Limitation     LTG 3: 12 weeks:  The patient will demonstrate limitation by achieving a score of 4/50 on the Neck Disability Index.    STATUS:  progressing   STG 3a: 4 weeks:  The patient will demonstrate limitation by achieving a score of 8/50 on the Neck Disability Index.      STATUS:  progressing        Plan  Therapy options: will be seen for skilled therapy services  Planned modality interventions: cryotherapy, dry needling, TENS, thermotherapy (hydrocollator packs), ultrasound and traction  Planned therapy interventions: flexibility, functional ROM exercises, home exercise program, manual therapy, neuromuscular re-education, postural training, soft tissue mobilization, spinal/joint mobilization, strengthening, stretching, therapeutic activities, balance/weight-bearing training, ADL retraining, body mechanics training and joint mobilization  Frequency: 2x week  Duration in weeks: 12  Treatment plan discussed with: patient  Plan details:             Visit Diagnoses:    ICD-10-CM ICD-9-CM   1. Pain, neck  M54.2 723.1   2. Decreased ROM of neck  R29.898 723.8   3. Posture imbalance  R29.3 729.90       Progress per Plan of Care    Timed:    Therapeutic Exercise:    12     mins  56995;  Manual Therapy:         mins  35150;     Neuromuscular Park:       mins  29693;    Therapeutic Activity:     18     mins  60652;     Gait Training:           mins  26074;     Ultrasound:          mins  05101;      Untimed:  Electrical Stimulation:         mins  42490 ( );  Mechanical Traction:         mins  40965;     Timed Treatment:   30   mins   Total Treatment:     32   mins      Sahara Lu PTA  Physical Therapist Assistant

## 2024-09-01 DIAGNOSIS — I10 ESSENTIAL HYPERTENSION: ICD-10-CM

## 2024-09-03 RX ORDER — LISINOPRIL 10 MG/1
10 TABLET ORAL DAILY
Qty: 90 TABLET | Refills: 1 | Status: SHIPPED | OUTPATIENT
Start: 2024-09-03

## 2024-09-04 ENCOUNTER — TREATMENT (OUTPATIENT)
Dept: PHYSICAL THERAPY | Facility: CLINIC | Age: 69
End: 2024-09-04
Payer: MEDICARE

## 2024-09-04 DIAGNOSIS — R29.3 POSTURE IMBALANCE: ICD-10-CM

## 2024-09-04 DIAGNOSIS — M54.2 PAIN, NECK: Primary | ICD-10-CM

## 2024-09-04 DIAGNOSIS — R29.898 DECREASED ROM OF NECK: ICD-10-CM

## 2024-09-04 NOTE — PROGRESS NOTES
Physical Therapy Daily Treatment Note      Patient: Senait Fong   : 1955  Referring practitioner: Yas Castillo MD  Date of Initial Visit: Type: THERAPY  Noted: 2024  Today's Date: 2024  Patient seen for 8 sessions           Subjective Evaluation    History of Present Illness    Subjective comment: 3/10 in the cervical region.       Objective   See Exercise, Manual, and Modality Logs for complete treatment.       Assessment & Plan       Assessment  Impairments: abnormal or restricted ROM, activity intolerance, impaired physical strength, lacks appropriate home exercise program and pain with function   Assessment details: Patient has low pain. Pt was educated on possibly doing some neck and SHLD yoga on youtube in order to help keep herself loose. Pt reports she doesn't feel she is getting any better and this is going to be her last appt. There cont to be tightness in the cervical muscles and SHLD B.    NDI is 6/50 at 12%    Goals  Plan Goals: CERVICAL PROBLEMS    1. The patient has limited ROM.    LTG 1: 12 weeks:  The patient will demonstrate 80° of bilateral cervical spine rotation in order to adequately monitor blind spots while driving and improve ability to perform activities of daily living.    STATUS:  progressing  STG 1a: 4 weeks:  The patient will demonstrate 60° of bilateral cervical spine rotation, in order to adequately monitor blind spots while driving and improve ability to perform activities of daily living.       STATUS:  progressing   TREATMENT:  Manual therapy, therapeutic exercise, home exercise instruction, cervical traction, and modalities as needed to include: moist heat, electrical stimulation, and ultrasound.     2. The patient has complaints of pain.   LTG 2: 12 weeks:  The patient will report 1/10 pain in order to more easily tolerate activities of daily living and improve sleep quality.    STATUS:  progressing   STG 2a: 4 weeks:  The patient will report 2/10  pain.    STATUS:  progressing  STG2b:  4 weeks:  The patient will be independent with home exercises.     STATUS:  progressing   TREATMENT: Manual therapy, therapeutic exercise, home exercise instruction, cervical traction, and modalities as needed to include: moist heat, electrical stimulation, and ultrasound.    3. Carrying, Moving, and Handling Objects Functional Limitation     LTG 3: 12 weeks:  The patient will demonstrate limitation by achieving a score of 4/50 on the Neck Disability Index.    STATUS:  progressing   STG 3a: 4 weeks:  The patient will demonstrate limitation by achieving a score of 8/50 on the Neck Disability Index.      STATUS:  progressing        Plan  Therapy options: will be seen for skilled therapy services  Planned modality interventions: cryotherapy, dry needling, TENS, thermotherapy (hydrocollator packs), ultrasound and traction  Planned therapy interventions: flexibility, functional ROM exercises, home exercise program, manual therapy, neuromuscular re-education, postural training, soft tissue mobilization, spinal/joint mobilization, strengthening, stretching, therapeutic activities, balance/weight-bearing training, ADL retraining, body mechanics training and joint mobilization  Frequency: 2x week  Duration in weeks: 12  Treatment plan discussed with: patient  Plan details:             Visit Diagnoses:    ICD-10-CM ICD-9-CM   1. Pain, neck  M54.2 723.1   2. Decreased ROM of neck  R29.898 723.8   3. Posture imbalance  R29.3 729.90       Progress per Plan of Care    Timed:    Therapeutic Exercise:    14     mins  45853;  Manual Therapy:    12     mins  57626;     Neuromuscular Prak:       mins  47615;    Therapeutic Activity:     14     mins  29096;     Gait Training:           mins  88595;     Ultrasound:          mins  80034;      Untimed:  Electrical Stimulation:         mins  37652 ( );  Mechanical Traction:         mins  33508;     Timed Treatment:   40   mins   Total  Treatment:     42   mins      Sahara Lu PTA  Physical Therapist Assistant

## 2024-10-31 DIAGNOSIS — E78.00 PURE HYPERCHOLESTEROLEMIA: ICD-10-CM

## 2024-10-31 DIAGNOSIS — F33.0 MAJOR DEPRESSIVE DISORDER, RECURRENT, MILD: ICD-10-CM

## 2024-10-31 RX ORDER — CITALOPRAM HYDROBROMIDE 20 MG/1
TABLET ORAL
Qty: 90 TABLET | Refills: 0 | Status: SHIPPED | OUTPATIENT
Start: 2024-10-31

## 2024-10-31 RX ORDER — EZETIMIBE 10 MG/1
10 TABLET ORAL DAILY
Qty: 90 TABLET | Refills: 0 | Status: SHIPPED | OUTPATIENT
Start: 2024-10-31

## 2025-01-09 DIAGNOSIS — F33.0 MAJOR DEPRESSIVE DISORDER, RECURRENT, MILD: ICD-10-CM

## 2025-01-09 DIAGNOSIS — E78.00 PURE HYPERCHOLESTEROLEMIA: ICD-10-CM

## 2025-01-09 RX ORDER — CELECOXIB 200 MG/1
CAPSULE ORAL
Qty: 90 CAPSULE | Refills: 0 | Status: SHIPPED | OUTPATIENT
Start: 2025-01-09

## 2025-01-09 RX ORDER — CITALOPRAM HYDROBROMIDE 20 MG/1
TABLET ORAL
Qty: 90 TABLET | Refills: 1 | Status: SHIPPED | OUTPATIENT
Start: 2025-01-09

## 2025-01-09 RX ORDER — EZETIMIBE 10 MG/1
10 TABLET ORAL DAILY
Qty: 90 TABLET | Refills: 1 | Status: SHIPPED | OUTPATIENT
Start: 2025-01-09

## 2025-01-29 ENCOUNTER — OFFICE VISIT (OUTPATIENT)
Dept: FAMILY MEDICINE CLINIC | Age: 70
End: 2025-01-29
Payer: MEDICARE

## 2025-01-29 VITALS
HEIGHT: 62 IN | HEART RATE: 52 BPM | DIASTOLIC BLOOD PRESSURE: 76 MMHG | SYSTOLIC BLOOD PRESSURE: 142 MMHG | TEMPERATURE: 98.3 F | BODY MASS INDEX: 30.73 KG/M2 | WEIGHT: 167 LBS | OXYGEN SATURATION: 94 %

## 2025-01-29 DIAGNOSIS — L30.9 ECZEMA, UNSPECIFIED TYPE: ICD-10-CM

## 2025-01-29 DIAGNOSIS — Z12.11 COLON CANCER SCREENING: ICD-10-CM

## 2025-01-29 DIAGNOSIS — H93.233 HEARING ABNORMALLY ACUTE, BILATERAL: ICD-10-CM

## 2025-01-29 DIAGNOSIS — Z78.0 MENOPAUSE: ICD-10-CM

## 2025-01-29 DIAGNOSIS — I10 ESSENTIAL HYPERTENSION: ICD-10-CM

## 2025-01-29 DIAGNOSIS — E78.00 PURE HYPERCHOLESTEROLEMIA: ICD-10-CM

## 2025-01-29 DIAGNOSIS — M54.2 NECK PAIN: ICD-10-CM

## 2025-01-29 DIAGNOSIS — J30.1 ALLERGIC RHINITIS DUE TO POLLEN, UNSPECIFIED SEASONALITY: ICD-10-CM

## 2025-01-29 DIAGNOSIS — Z23 ENCOUNTER FOR IMMUNIZATION: ICD-10-CM

## 2025-01-29 DIAGNOSIS — F33.0 MAJOR DEPRESSIVE DISORDER, RECURRENT, MILD: ICD-10-CM

## 2025-01-29 DIAGNOSIS — Z12.31 SCREENING MAMMOGRAM FOR BREAST CANCER: ICD-10-CM

## 2025-01-29 DIAGNOSIS — E66.09 CLASS 1 OBESITY DUE TO EXCESS CALORIES WITH SERIOUS COMORBIDITY AND BODY MASS INDEX (BMI) OF 30.0 TO 30.9 IN ADULT: ICD-10-CM

## 2025-01-29 DIAGNOSIS — E55.9 VITAMIN D DEFICIENCY: ICD-10-CM

## 2025-01-29 DIAGNOSIS — E66.811 CLASS 1 OBESITY DUE TO EXCESS CALORIES WITH SERIOUS COMORBIDITY AND BODY MASS INDEX (BMI) OF 30.0 TO 30.9 IN ADULT: ICD-10-CM

## 2025-01-29 DIAGNOSIS — Z00.00 ENCOUNTER FOR ANNUAL WELLNESS EXAM IN MEDICARE PATIENT: Primary | ICD-10-CM

## 2025-01-29 PROCEDURE — 3078F DIAST BP <80 MM HG: CPT | Performed by: FAMILY MEDICINE

## 2025-01-29 PROCEDURE — G2211 COMPLEX E/M VISIT ADD ON: HCPCS | Performed by: FAMILY MEDICINE

## 2025-01-29 PROCEDURE — 99214 OFFICE O/P EST MOD 30 MIN: CPT | Performed by: FAMILY MEDICINE

## 2025-01-29 PROCEDURE — 1170F FXNL STATUS ASSESSED: CPT | Performed by: FAMILY MEDICINE

## 2025-01-29 PROCEDURE — 3077F SYST BP >= 140 MM HG: CPT | Performed by: FAMILY MEDICINE

## 2025-01-29 PROCEDURE — 1126F AMNT PAIN NOTED NONE PRSNT: CPT | Performed by: FAMILY MEDICINE

## 2025-01-29 PROCEDURE — G0439 PPPS, SUBSEQ VISIT: HCPCS | Performed by: FAMILY MEDICINE

## 2025-01-29 RX ORDER — LOTEPREDNOL ETABONATE 3.8 MG/G
1 GEL OPHTHALMIC 2 TIMES DAILY
COMMUNITY
Start: 2025-01-21

## 2025-01-29 RX ORDER — DICLOFENAC SODIUM 75 MG/1
75 TABLET, DELAYED RELEASE ORAL 2 TIMES DAILY
Qty: 60 TABLET | Refills: 2 | Status: SHIPPED | OUTPATIENT
Start: 2025-01-29

## 2025-01-29 NOTE — PATIENT INSTRUCTIONS
"Exercises to do While Sitting    Exercises that you do while sitting (chair exercises) can give you many of the same benefits as full exercise. Benefits include strengthening your heart, burning calories, and keeping muscles and joints healthy. Exercise can also improve your mood and help with depression and anxiety.  You may benefit from chair exercises if you are unable to do standing exercises due to:  Diabetic foot pain.  Obesity.  Illness.  Arthritis.  Recovery from surgery or injury.  Breathing problems.  Balance problems.  Another type of disability.  Before starting chair exercises, check with your health care provider or a physical therapist to find out how much exercise you can tolerate and which exercises are safe for you. If your health care provider approves:  Start out slowly and build up over time. Aim to work up to about 10-20 minutes for each exercise session.  Make exercise part of your daily routine.  Drink water when you exercise. Do not wait until you are thirsty. Drink every 10-15 minutes.  Stop exercising right away if you have pain, nausea, shortness of breath, or dizziness.  If you are exercising in a wheelchair, make sure to lock the wheels.  Ask your health care provider whether you can do reyna chi or yoga. Many positions in these mind-body exercises can be modified to do while seated.  Warm-up  Before starting other exercises:  Sit up as straight as you can. Have your knees bent at 90 degrees, which is the shape of the capital letter \"L.\" Keep your feet flat on the floor.  Sit at the front edge of your chair, if you can.  Pull in (tighten) the muscles in your abdomen and stretch your spine and neck as straight as you can. Hold this position for a few minutes.  Breathe in and out evenly. Try to concentrate on your breathing, and relax your mind.  Stretching  Exercise A: Arm stretch  Hold your arms out straight in front of your body.  Bend your hands at the wrist with your fingers pointing " "up, as if signaling someone to stop. Notice the slight tension in your forearms as you hold the position.  Keeping your arms out and your hands bent, rotate your hands outward as far as you can and hold this stretch. Aim to have your thumbs pointing up and your pinkie fingers pointing down.  Slowly repeat arm stretches for one minute as tolerated.  Exercise B: Leg stretch  If you can move your legs, try to \"draw\" letters on the floor with the toes of your foot. Write your name with one foot.  Write your name with the toes of your other foot.  Slowly repeat the movements for one minute as tolerated.  Exercise C: Reach for the teofilo  Reach your hands as far over your head as you can to stretch your spine.  Move your hands and arms as if you are climbing a rope.  Slowly repeat the movements for one minute as tolerated.  Range of motion exercises  Exercise A: Shoulder roll  Let your arms hang loosely at your sides.  Lift just your shoulders up toward your ears, then let them relax back down.  When your shoulders feel loose, rotate your shoulders in backward and forward circles.  Do shoulder rolls slowly for one minute as tolerated.  Exercise B: March in place  As if you are marching, pump your arms and lift your legs up and down. Lift your knees as high as you can.  If you are unable to lift your knees, just pump your arms and move your ankles and feet up and down.  March in place for one minute as tolerated.  Exercise C: Seated jumping jacks  Let your arms hang down straight.  Keeping your arms straight, lift them up over your head. Aim to point your fingers to the ceiling.  While you lift your arms, straighten your legs and slide your heels along the floor to your sides, as wide as you can.  As you bring your arms back down to your sides, slide your legs back together.  If you are unable to use your legs, just move your arms.  Slowly repeat seated jumping jacks for one minute as tolerated.  Strengthening " exercises  Exercise A: Shoulder squeeze  Hold your arms straight out from your body to your sides, with your elbows bent and your fists pointed at the ceiling.  Keeping your arms in the bent position, move them forward so your elbows and forearms meet in front of your face.  Open your arms back out as wide as you can with your elbows still bent, until you feel your shoulder blades squeezing together. Hold for 5 seconds.  Slowly repeat the movements forward and backward for one minute as tolerated.  Contact a health care provider if:  You have to stop exercising due to any of the following:  Pain.  Nausea.  Shortness of breath.  Dizziness.  Fatigue.  You have significant pain or soreness after exercising.  Get help right away if:  You have chest pain.  You have difficulty breathing.  These symptoms may represent a serious problem that is an emergency. Do not wait to see if the symptoms will go away. Get medical help right away. Call your local emergency services (911 in the [place] Do not drive yourself to the hospital.  Summary  Exercises that you do while sitting (chair exercises) can strengthen your heart, burn calories, and keep muscles and joints healthy.  You may benefit from chair exercises if you are unable to do standing exercises due to diabetic foot pain, obesity, recovery from surgery or injury, or other conditions.  Before starting chair exercises, check with your health care provider or a physical therapist to find out how much exercise you can tolerate and which exercises are safe for you.  This information is not intended to replace advice given to you by your health care provider. Make sure you discuss any questions you have with your health care provider.  Document Revised: 02/13/2022 Document Reviewed: 02/13/2022  [employer] Patient Education © 2023 [employer]

## 2025-01-29 NOTE — PROGRESS NOTES
The ABCs of the Annual Wellness Visit  Medicare Wellness Visit    Subjective    Senait Fong is a 70 y.o. female who presents for a Medicare Wellness Visit.    The following portions of the patient's history were reviewed and   updated as appropriate: allergies, current medications, past family history, past medical history, past social history, past surgical history, and problem list.    Compared to one year ago, the patient feels her physical   health is the same.    Compared to one year ago, the patient feels her mental   health is the same.    Recent Hospitalizations:  She was not admitted to the hospital during the last year.       Advance Care Planning  Advance Directive is not on file.  ACP discussion was held with the patient during this visit. Patient does not have an advance directive, information provided.    Does the patient have evidence of cognitive impairment? No    Aspirin is not on active medication list.  Aspirin use is not indicated based on review of current medical condition/s. Risk of harm outweighs potential benefits.  .    Patient Active Problem List   Diagnosis    Encounter for annual wellness exam in Medicare patient    Pure hypercholesterolemia    Major depressive disorder, recurrent, mild    Myalgia    Allergic rhinitis    Murmur, cardiac    Eczema    Primary insomnia    Subacute cough    Essential hypertension    Class 1 obesity due to excess calories with serious comorbidity and body mass index (BMI) of 30.0 to 30.9 in adult    Bilateral impacted cerumen    Hearing abnormally acute, bilateral       Current Medical Providers:  Patient Care Team:  Yas Castillo MD as PCP - General (Family Medicine)    No opioid medication identified on active medication list. I have reviewed chart for other potential  high risk medication/s and harmful drug interactions in the elderly.             Objective    Vitals:    01/29/25 1341 01/29/25 1435   BP: 154/88 142/76   BP Location: Left arm   "  Patient Position: Sitting    Cuff Size: Adult    Pulse: 52    Temp: 98.3 °F (36.8 °C)    TempSrc: Oral    SpO2: 94%    Weight: 75.8 kg (167 lb)    Height: 156.8 cm (61.73\")    PainSc: 0-No pain      Estimated body mass index is 30.81 kg/m² as calculated from the following:    Height as of this encounter: 156.8 cm (61.73\").    Weight as of this encounter: 75.8 kg (167 lb).    BMI is >= 30 and <35. (Class 1 Obesity). The following options were offered after discussion;: exercise counseling/recommendations and nutrition counseling/recommendations      Gait and Balance Evaluation: Normal          HEALTH RISK ASSESSMENT    Smoking Status:  Social History     Tobacco Use   Smoking Status Former    Average packs/day: 1 pack/day for 3.0 years (3.0 ttl pk-yrs)    Types: Cigarettes    Start date:     Quit date:     Years since quittin.0    Passive exposure: Past   Smokeless Tobacco Never     Alcohol Consumption:  Social History     Substance and Sexual Activity   Alcohol Use Yes     Fall Risk Screen:    STEADI Fall Risk Assessment was completed, and patient is at LOW risk for falls.Assessment completed on:2025    Depression Screenin/29/2025     1:46 PM   PHQ-2/PHQ-9 Depression Screening   Little interest or pleasure in doing things Not at all   Feeling down, depressed, or hopeless Not at all       Health Habits and Functional and Cognitive Screenin/29/2025     1:46 PM   Functional & Cognitive Status   Do you have difficulty preparing food and eating? No   Do you have difficulty bathing yourself, getting dressed or grooming yourself? No   Do you have difficulty using the toilet? No   Do you have difficulty moving around from place to place? No   Do you have trouble with steps or getting out of a bed or a chair? No   Current Diet Unhealthy Diet   Dental Exam Not up to date   Eye Exam Up to date   Exercise (times per week) 0 times per week   Current Exercises Include No Regular Exercise   Do " you need help using the phone?  No   Are you deaf or do you have serious difficulty hearing?  Yes   Do you need help to go to places out of walking distance? No   Do you need help shopping? No   Do you need help preparing meals?  No   Do you need help with housework?  No   Do you need help with laundry? No   Do you need help taking your medications? No   Do you need help managing money? No   Do you ever drive or ride in a car without wearing a seat belt? No   Have you felt unusual stress, anger or loneliness in the last month? No   Who do you live with? Spouse   If you need help, do you have trouble finding someone available to you? No   Have you been bothered in the last four weeks by sexual problems? No   Do you have difficulty concentrating, remembering or making decisions? No       Visual Acuity:             Age-appropriate Screening Schedule:  Refer to the list below for future screening recommendations based on patient's age, sex and/or medical conditions. Orders for these recommended tests are listed in the plan section. The patient has been provided with a written plan.    Health Maintenance   Topic Date Due    ZOSTER VACCINE (1 of 2) Never done    Pneumococcal Vaccine 65+ (1 of 1 - PCV) Never done    DXA SCAN  04/26/2023    INFLUENZA VACCINE  07/01/2024    COVID-19 Vaccine (1 - 2024-25 season) Never done    TDAP/TD VACCINES (1 - Tdap) 06/13/2025 (Originally 1/3/1974)    LIPID PANEL  06/13/2025    MAMMOGRAM  12/04/2025    ANNUAL WELLNESS VISIT  01/29/2026    BMI FOLLOWUP  01/29/2026    COLORECTAL CANCER SCREENING  02/03/2026    HEPATITIS C SCREENING  Completed              Outpatient Medications Prior to Visit   Medication Sig Dispense Refill    ascorbic acid (VITAMIN C) 100 MG tablet Take 1 tablet by mouth Daily.      citalopram (CeleXA) 20 MG tablet TAKE 1 TABLET EVERY NIGHT AT BEDTIME 90 tablet 1    ezetimibe (ZETIA) 10 MG tablet TAKE 1 TABLET DAILY 90 tablet 1    lisinopril (PRINIVIL,ZESTRIL) 10 MG  tablet Take 1 tablet by mouth Daily. 90 tablet 1    loratadine (CLARITIN) 10 MG tablet Take 1 tablet by mouth Daily.      Lotemax SM 0.38 % gel Administer 1 drop to both eyes 2 (Two) Times a Day.      multivitamin with minerals tablet tablet Take 1 tablet by mouth Daily.      triamcinolone (KENALOG) 0.1 % ointment Apply 1 Application topically to the appropriate area as directed 2 (Two) Times a Day. 15 g 0    celecoxib (CeleBREX) 200 MG capsule TAKE 1 CAPSULE DAILY 90 capsule 0     No facility-administered medications prior to visit.       CMS Preventative Services Quick Reference  Risk Factors Identified During Encounter  Chronic Pain: Home exercise plan outlined.  OTC analgesics as needed. Proper dosing schedule discussed.   Hearing Problem:  she defers testing  Immunizations Discussed/Encouraged: Tdap, Influenza, Prevnar 20 (Pneumococcal 20-valent conjugate), Shingrix, and COVID19  Inactivity/Sedentary: Patient was advised to exercise at least 150 minutes a week per CDC recommendations.  Dental Screening Recommended  Vision Screening Recommended  The above risks/problems have been discussed with the patient.  Pertinent information has been shared with the patient in the After Visit Summary.  An After Visit Summary and PPPS were made available to the patient.    Follow Up:   Next Medicare Wellness visit to be scheduled in 1 year.       Additional E&M Note during same encounter follows:  Patient has multiple medical problems which are significant and separately identifiable that require additional work above and beyond the Medicare Wellness Visit.         Senait Fong presents to [facility].    Chief Complaint: cholesterol follow up        Subjective   History of Present Illness:  HPI       She has eczema that has been acting up.  Also issues with dry skin over and under her eyes -saw her eye doctor and was placed on antihistamine LOTAMAX gel drops and topical steroid and she is  much better.  She started her triamincinolone topical steroid cream that has helped.  I recommend she take luke warm showers, use aveeno bid, pat dry, avoid perfumes and dyes in detergents, using moisturizing soap.     She presents with chronic allergies stable on loratadine.    H/o elevated BP, on lisinopril, tolerates med well.     She presents with depression/anxiety and she is doing great on celexa, happy, denies anxiety.  Works at [employer].  Denies SI/HI.     Her neck pain is ongoing, she tried PT without improvement, now using a redlight heat and tried CBD cream.  Current symptoms is chronic stiffness and pain.  Currently on celebrex and tolerate well but her pain is ongoing and moderate severity intermittently    XR SPINE CERVICAL COMPLETE 4 OR 5 VW  Date of Exam: 6/13/2024 10:12 AM EDT  Findings: On the oblique views, there is narrowing of the intervertebral foramina at C5-6 on the right and at C3-4, C5-6 and to a lesser degree C6-7 on the left. The relationship of the lateral pillars of C1 with respect to C2 does not appear unusual.  There is slight anterior offset of C3 3 on C4. There is spurring about the disc space at C5-6 and C6-7.  IMPRESSION:1.Multilevel degenerative change.               Result Review   The following data was reviewed by Yas Castillo MD on 01/29/2025.  Lab Results   Component Value Date    WBC 6.33 06/13/2024    HGB 14.6 06/13/2024    HCT 44.3 06/13/2024    MCV 89.9 06/13/2024     06/13/2024     Lab Results   Component Value Date    GLUCOSE 96 06/13/2024    BUN 21 06/13/2024    CREATININE 0.73 06/13/2024     06/13/2024    K 4.1 06/13/2024     06/13/2024    CALCIUM 9.8 06/13/2024    PROTEINTOT 6.8 06/13/2024    ALBUMIN 4.7 06/13/2024    ALT 14 06/13/2024    AST 13 06/13/2024    ALKPHOS 55 06/13/2024    BILITOT 0.4 06/13/2024    GLOB 2.1 06/13/2024    AGRATIO 2.2 06/13/2024    BCR 28.8 (H) 06/13/2024    ANIONGAP 8.0 06/13/2024    EGFR 88.6  "06/13/2024     Lab Results   Component Value Date    CHOL 244 (H) 06/13/2024    CHLPL 134 10/29/2020    TRIG 127 06/13/2024    HDL 75 (H) 06/13/2024     (H) 06/13/2024     Lab Results   Component Value Date    TSH 1.750 11/23/2022     No results found for: \"HGBA1C\"  No results found for: \"PSA\"  No results found for: \"IRON\"   No results found for: \"VSJD99SB\"          Assessment and Plan:   Diagnoses and all orders for this visit:    1. Encounter for annual wellness exam in Medicare patient (Primary)    2. Menopause  -     DEXA Bone Density Axial; Future    3. Screening mammogram for breast cancer  -     Mammo Screening Digital Tomosynthesis Bilateral With CAD; Future    4. Encounter for immunization  Comments:  Recommend Shingrix, Prevnar 20, flu, COVID, and Tdap and she defers all vaccines    5. Colon cancer screening  Comments:  Cologuard performed in 2023.  Will repeat in 2026    6. Pure hypercholesterolemia  Comments:  Currently on Zetia and tolerates med well.  Also following low-cholesterol diet.  Will check labs and adjust Tx plan pending results  Orders:  -     Lipid Panel; Future    7. Major depressive disorder, recurrent, mild  Comments:  Overall stable and well-controlled on Celexa.  No changes in current treatment plan or medications needed.  Denies HI/SI.    8. Essential hypertension  Comments:  Borderline,on lisinopril and tolerates med well.She has gained 8 #s, will work have  on healthy diet/exercise/wt loss.Rec avoid NA call if home BP> 140/90  Orders:  -     Comprehensive Metabolic Panel; Future  -     CBC (No Diff); Future    9. Eczema, unspecified type  Comments:  Recommend she switch her lotion to Aveeno or Eucerin and use bid along with moisturizing soap, lukewarm shower, pat dry, avoid perfume/dyes in detergents    10. Allergic rhinitis due to pollen, unspecified seasonality  Comments:  Allergies are overall stable on Claritin daily.    11. Neck pain  Comments:  Ongoing neck pain.She " "completed PT w/o improvement. Rec gentle stretching exercises at home,change Celebrex to diclofenac, call if no improvement/worsening sxs  Orders:  -     diclofenac (VOLTAREN) 75 MG EC tablet; Take 1 tablet by mouth 2 (Two) Times a Day.  Dispense: 60 tablet; Refill: 2    12. Hearing abnormally acute, bilateral  Comments:  She defers retesting today    13. Vitamin D deficiency  Comments:  Not currently on vitamin D supplementation.  Will check labs and adjust Tx plan pending results  Orders:  -     Vitamin D 25 hydroxy; Future    14. Class 1 obesity due to excess calories with serious comorbidity and body mass index (BMI) of 30.0 to 30.9 in adult  Comments:  Counseled on healthy diet and daily exercise.  She defers referral to dietitian                    Medications:      Current Outpatient Medications:     ascorbic acid (VITAMIN C) 100 MG tablet, Take 1 tablet by mouth Daily., Disp: , Rfl:     citalopram (CeleXA) 20 MG tablet, TAKE 1 TABLET EVERY NIGHT AT BEDTIME, Disp: 90 tablet, Rfl: 1    ezetimibe (ZETIA) 10 MG tablet, TAKE 1 TABLET DAILY, Disp: 90 tablet, Rfl: 1    lisinopril (PRINIVIL,ZESTRIL) 10 MG tablet, Take 1 tablet by mouth Daily., Disp: 90 tablet, Rfl: 1    loratadine (CLARITIN) 10 MG tablet, Take 1 tablet by mouth Daily., Disp: , Rfl:     Lotemax SM 0.38 % gel, Administer 1 drop to both eyes 2 (Two) Times a Day., Disp: , Rfl:     multivitamin with minerals tablet tablet, Take 1 tablet by mouth Daily., Disp: , Rfl:     triamcinolone (KENALOG) 0.1 % ointment, Apply 1 Application topically to the appropriate area as directed 2 (Two) Times a Day., Disp: 15 g, Rfl: 0    diclofenac (VOLTAREN) 75 MG EC tablet, Take 1 tablet by mouth 2 (Two) Times a Day., Disp: 60 tablet, Rfl: 2       Objective   Vital Signs:   /76   Pulse 52   Temp 98.3 °F (36.8 °C) (Oral)   Ht 156.8 cm (61.73\")   Wt 75.8 kg (167 lb)   SpO2 94%   BMI 30.81 kg/m²       Physical Exam:  Physical Exam  Vitals and nursing note " reviewed.   Constitutional:       General: She is not in acute distress.     Appearance: Normal appearance. She is not ill-appearing, toxic-appearing or diaphoretic.   HENT:      Head: Normocephalic and atraumatic.      Right Ear: Tympanic membrane, ear canal and external ear normal.      Left Ear: Tympanic membrane, ear canal and external ear normal.      Nose: No congestion or rhinorrhea.      Mouth/Throat:      Mouth: Mucous membranes are moist.      Pharynx: Oropharynx is clear. No oropharyngeal exudate or posterior oropharyngeal erythema.   Eyes:      Extraocular Movements: Extraocular movements intact.      Conjunctiva/sclera: Conjunctivae normal.      Pupils: Pupils are equal, round, and reactive to light.   Cardiovascular:      Rate and Rhythm: Normal rate and regular rhythm.      Heart sounds: Normal heart sounds.   Pulmonary:      Effort: Pulmonary effort is normal.      Breath sounds: Normal breath sounds. No wheezing, rhonchi or rales.   Chest:   Breasts:     Right: Normal.      Left: Normal.   Abdominal:      General: Abdomen is flat.      Palpations: Abdomen is soft. There is no mass.      Tenderness: There is no abdominal tenderness.      Hernia: No hernia is present.   Musculoskeletal:      Cervical back: Neck supple. No rigidity.      Right lower leg: No edema.      Left lower leg: No edema.   Lymphadenopathy:      Cervical: No cervical adenopathy.      Upper Body:      Right upper body: No axillary adenopathy.      Left upper body: No axillary adenopathy.   Skin:     General: Skin is warm and dry.   Neurological:      General: No focal deficit present.      Mental Status: She is alert and oriented to person, place, and time. Mental status is at baseline.   Psychiatric:         Mood and Affect: Mood normal.         Behavior: Behavior normal.         Thought Content: Thought content normal.         Judgment: Judgment normal.                     Review of Systems:  Review of Systems   Constitutional:   Positive for unexpected weight gain. Negative for chills, fatigue and fever.   HENT:  Negative for ear pain, sinus pressure and sore throat.    Eyes:  Negative for blurred vision and double vision.   Respiratory:  Negative for cough, shortness of breath and wheezing.    Cardiovascular:  Negative for chest pain and palpitations.   Gastrointestinal:  Negative for abdominal pain, blood in stool, constipation, diarrhea, nausea and vomiting.   Musculoskeletal:  Positive for neck pain.   Skin:  Negative for rash.   Neurological:  Negative for dizziness and headache.   Psychiatric/Behavioral:  Negative for sleep disturbance, suicidal ideas and depressed mood. The patient is not nervous/anxious.             Follow Up   Return in about 6 months (around 2025) for Recheck.    Part of this note may be an electronic transcription/translation of spoken language to printed   text using the Dragon Dictation System.            Medical History:  Past Medical History:    Anxiety    Ongoing    Depression    Ongoing     No past surgical history on file.   Family History   Problem Relation Age of Onset    Alcohol abuse Father     Anxiety disorder Sister      Social History     Tobacco Use    Smoking status: Former     Average packs/day: 1 pack/day for 3.0 years (3.0 ttl pk-yrs)     Types: Cigarettes     Start date:      Quit date: 2000     Years since quittin.0     Passive exposure: Past    Smokeless tobacco: Never   Substance Use Topics    Alcohol use: Yes       Health Maintenance Due   Topic Date Due    ZOSTER VACCINE (1 of 2) Never done    Pneumococcal Vaccine 65+ (1 of 1 - PCV) Never done    DXA SCAN  2023    INFLUENZA VACCINE  2024    COVID-19 Vaccine (2024- season) Never done          There is no immunization history on file for this patient.    Allergies   Allergen Reactions    Codeine Nausea Only

## 2025-02-04 ENCOUNTER — LAB (OUTPATIENT)
Dept: LAB | Facility: HOSPITAL | Age: 70
End: 2025-02-04
Payer: MEDICARE

## 2025-02-04 DIAGNOSIS — E78.00 PURE HYPERCHOLESTEROLEMIA: ICD-10-CM

## 2025-02-04 DIAGNOSIS — I10 ESSENTIAL HYPERTENSION: ICD-10-CM

## 2025-02-04 DIAGNOSIS — E55.9 VITAMIN D DEFICIENCY: ICD-10-CM

## 2025-02-04 LAB
25(OH)D3 SERPL-MCNC: 55.8 NG/ML (ref 30–100)
ALBUMIN SERPL-MCNC: 4 G/DL (ref 3.5–5.2)
ALBUMIN/GLOB SERPL: 1.4 G/DL
ALP SERPL-CCNC: 53 U/L (ref 39–117)
ALT SERPL W P-5'-P-CCNC: 19 U/L (ref 1–33)
ANION GAP SERPL CALCULATED.3IONS-SCNC: 10 MMOL/L (ref 5–15)
AST SERPL-CCNC: 18 U/L (ref 1–32)
BILIRUB SERPL-MCNC: 0.4 MG/DL (ref 0–1.2)
BUN SERPL-MCNC: 20 MG/DL (ref 8–23)
BUN/CREAT SERPL: 23 (ref 7–25)
CALCIUM SPEC-SCNC: 9.8 MG/DL (ref 8.6–10.5)
CHLORIDE SERPL-SCNC: 102 MMOL/L (ref 98–107)
CHOLEST SERPL-MCNC: 268 MG/DL (ref 0–200)
CO2 SERPL-SCNC: 27 MMOL/L (ref 22–29)
CREAT SERPL-MCNC: 0.87 MG/DL (ref 0.57–1)
DEPRECATED RDW RBC AUTO: 38.8 FL (ref 37–54)
EGFRCR SERPLBLD CKD-EPI 2021: 71.3 ML/MIN/1.73
ERYTHROCYTE [DISTWIDTH] IN BLOOD BY AUTOMATED COUNT: 11.8 % (ref 12.3–15.4)
GLOBULIN UR ELPH-MCNC: 2.9 GM/DL
GLUCOSE SERPL-MCNC: 112 MG/DL (ref 65–99)
HCT VFR BLD AUTO: 42.6 % (ref 34–46.6)
HDLC SERPL-MCNC: 59 MG/DL (ref 40–60)
HGB BLD-MCNC: 14.2 G/DL (ref 12–15.9)
LDLC SERPL CALC-MCNC: 181 MG/DL (ref 0–100)
LDLC/HDLC SERPL: 3.02 {RATIO}
MCH RBC QN AUTO: 29.6 PG (ref 26.6–33)
MCHC RBC AUTO-ENTMCNC: 33.3 G/DL (ref 31.5–35.7)
MCV RBC AUTO: 88.8 FL (ref 79–97)
PLATELET # BLD AUTO: 281 10*3/MM3 (ref 140–450)
PMV BLD AUTO: 9.8 FL (ref 6–12)
POTASSIUM SERPL-SCNC: 4.6 MMOL/L (ref 3.5–5.2)
PROT SERPL-MCNC: 6.9 G/DL (ref 6–8.5)
RBC # BLD AUTO: 4.8 10*6/MM3 (ref 3.77–5.28)
SODIUM SERPL-SCNC: 139 MMOL/L (ref 136–145)
TRIGL SERPL-MCNC: 153 MG/DL (ref 0–150)
VLDLC SERPL-MCNC: 28 MG/DL (ref 5–40)
WBC NRBC COR # BLD AUTO: 5.46 10*3/MM3 (ref 3.4–10.8)

## 2025-02-04 PROCEDURE — 80053 COMPREHEN METABOLIC PANEL: CPT

## 2025-02-04 PROCEDURE — 36415 COLL VENOUS BLD VENIPUNCTURE: CPT

## 2025-02-04 PROCEDURE — 85027 COMPLETE CBC AUTOMATED: CPT

## 2025-02-04 PROCEDURE — 82306 VITAMIN D 25 HYDROXY: CPT

## 2025-02-04 PROCEDURE — 80061 LIPID PANEL: CPT

## 2025-02-28 DIAGNOSIS — E78.00 PURE HYPERCHOLESTEROLEMIA: Primary | ICD-10-CM

## 2025-02-28 RX ORDER — FENOFIBRATE 145 MG/1
145 TABLET, COATED ORAL DAILY
Qty: 30 TABLET | Refills: 2 | Status: SHIPPED | OUTPATIENT
Start: 2025-02-28

## 2025-03-07 ENCOUNTER — TELEPHONE (OUTPATIENT)
Dept: FAMILY MEDICINE CLINIC | Age: 70
End: 2025-03-07
Payer: MEDICARE

## 2025-03-07 NOTE — TELEPHONE ENCOUNTER
Spoke to pt    It's came to our attention that at your last visit your blood pressure was elevated. Please come in at your earliest convenience for a free BP check.  Monday - Thursday, 9am-4:30pm.     Thank you.

## 2025-04-02 ENCOUNTER — HOSPITAL ENCOUNTER (OUTPATIENT)
Dept: BONE DENSITY | Facility: HOSPITAL | Age: 70
Discharge: HOME | End: 2025-04-02
Payer: MEDICARE

## 2025-04-02 ENCOUNTER — HOSPITAL ENCOUNTER (OUTPATIENT)
Dept: MAMMOGRAPHY | Facility: HOSPITAL | Age: 70
Discharge: HOME | End: 2025-04-02
Payer: MEDICARE

## 2025-04-02 DIAGNOSIS — Z12.31 SCREENING MAMMOGRAM FOR BREAST CANCER: ICD-10-CM

## 2025-04-02 DIAGNOSIS — Z78.0 MENOPAUSE: ICD-10-CM

## 2025-04-02 PROCEDURE — 77080 DXA BONE DENSITY AXIAL: CPT

## 2025-04-02 PROCEDURE — 77063 BREAST TOMOSYNTHESIS BI: CPT

## 2025-04-02 PROCEDURE — 77067 SCR MAMMO BI INCL CAD: CPT

## 2025-04-03 DIAGNOSIS — M54.2 NECK PAIN: ICD-10-CM

## 2025-04-03 DIAGNOSIS — E78.00 PURE HYPERCHOLESTEROLEMIA: ICD-10-CM

## 2025-04-03 RX ORDER — DICLOFENAC SODIUM 75 MG/1
75 TABLET, DELAYED RELEASE ORAL 2 TIMES DAILY
Qty: 60 TABLET | Refills: 2 | Status: SHIPPED | OUTPATIENT
Start: 2025-04-03

## 2025-04-03 RX ORDER — FENOFIBRATE 145 MG/1
145 TABLET, COATED ORAL DAILY
Qty: 30 TABLET | Refills: 2 | Status: SHIPPED | OUTPATIENT
Start: 2025-04-03

## 2025-06-12 ENCOUNTER — TELEPHONE (OUTPATIENT)
Dept: FAMILY MEDICINE CLINIC | Age: 70
End: 2025-06-12
Payer: MEDICARE

## 2025-06-25 ENCOUNTER — LAB (OUTPATIENT)
Dept: LAB | Facility: HOSPITAL | Age: 70
End: 2025-06-25
Payer: MEDICARE

## 2025-06-25 DIAGNOSIS — E78.00 PURE HYPERCHOLESTEROLEMIA: ICD-10-CM

## 2025-06-25 LAB
ALBUMIN SERPL-MCNC: 4.5 G/DL (ref 3.5–5.2)
ALBUMIN/GLOB SERPL: 2 G/DL
ALP SERPL-CCNC: 36 U/L (ref 39–117)
ALT SERPL W P-5'-P-CCNC: 24 U/L (ref 1–33)
ANION GAP SERPL CALCULATED.3IONS-SCNC: 9.2 MMOL/L (ref 5–15)
AST SERPL-CCNC: 21 U/L (ref 1–32)
BILIRUB SERPL-MCNC: 0.4 MG/DL (ref 0–1.2)
BUN SERPL-MCNC: 19 MG/DL (ref 8–23)
BUN/CREAT SERPL: 21.6 (ref 7–25)
CALCIUM SPEC-SCNC: 9.9 MG/DL (ref 8.6–10.5)
CHLORIDE SERPL-SCNC: 102 MMOL/L (ref 98–107)
CHOLEST SERPL-MCNC: 182 MG/DL (ref 0–200)
CO2 SERPL-SCNC: 26.8 MMOL/L (ref 22–29)
CREAT SERPL-MCNC: 0.88 MG/DL (ref 0.57–1)
EGFRCR SERPLBLD CKD-EPI 2021: 70.4 ML/MIN/1.73
GLOBULIN UR ELPH-MCNC: 2.2 GM/DL
GLUCOSE SERPL-MCNC: 98 MG/DL (ref 65–99)
HDLC SERPL-MCNC: 56 MG/DL (ref 40–60)
LDLC SERPL CALC-MCNC: 111 MG/DL (ref 0–100)
LDLC/HDLC SERPL: 1.96 {RATIO}
POTASSIUM SERPL-SCNC: 4.3 MMOL/L (ref 3.5–5.2)
PROT SERPL-MCNC: 6.7 G/DL (ref 6–8.5)
SODIUM SERPL-SCNC: 138 MMOL/L (ref 136–145)
TRIGL SERPL-MCNC: 82 MG/DL (ref 0–150)
VLDLC SERPL-MCNC: 15 MG/DL (ref 5–40)

## 2025-06-25 PROCEDURE — 80061 LIPID PANEL: CPT

## 2025-06-25 PROCEDURE — 80053 COMPREHEN METABOLIC PANEL: CPT

## 2025-06-25 PROCEDURE — 36415 COLL VENOUS BLD VENIPUNCTURE: CPT

## 2025-06-30 DIAGNOSIS — E78.00 PURE HYPERCHOLESTEROLEMIA: ICD-10-CM

## 2025-06-30 RX ORDER — FENOFIBRATE 145 MG/1
145 TABLET, FILM COATED ORAL DAILY
Qty: 30 TABLET | Refills: 2 | Status: SHIPPED | OUTPATIENT
Start: 2025-06-30

## 2025-07-15 ENCOUNTER — OFFICE VISIT (OUTPATIENT)
Dept: FAMILY MEDICINE CLINIC | Age: 70
End: 2025-07-15
Payer: MEDICARE

## 2025-07-15 VITALS
OXYGEN SATURATION: 97 % | SYSTOLIC BLOOD PRESSURE: 139 MMHG | HEIGHT: 62 IN | DIASTOLIC BLOOD PRESSURE: 82 MMHG | WEIGHT: 168.4 LBS | HEART RATE: 55 BPM | BODY MASS INDEX: 30.99 KG/M2

## 2025-07-15 DIAGNOSIS — L20.84 INTRINSIC ECZEMA: Primary | ICD-10-CM

## 2025-07-15 DIAGNOSIS — I10 ESSENTIAL HYPERTENSION: ICD-10-CM

## 2025-07-15 DIAGNOSIS — E78.00 PURE HYPERCHOLESTEROLEMIA: ICD-10-CM

## 2025-07-15 DIAGNOSIS — F51.01 PRIMARY INSOMNIA: ICD-10-CM

## 2025-07-15 DIAGNOSIS — J30.9 ALLERGIC RHINITIS, UNSPECIFIED SEASONALITY, UNSPECIFIED TRIGGER: ICD-10-CM

## 2025-07-15 DIAGNOSIS — M54.2 NECK PAIN: ICD-10-CM

## 2025-07-15 DIAGNOSIS — F33.0 MAJOR DEPRESSIVE DISORDER, RECURRENT, MILD: ICD-10-CM

## 2025-07-15 PROCEDURE — 3079F DIAST BP 80-89 MM HG: CPT | Performed by: FAMILY MEDICINE

## 2025-07-15 PROCEDURE — 1126F AMNT PAIN NOTED NONE PRSNT: CPT | Performed by: FAMILY MEDICINE

## 2025-07-15 PROCEDURE — G2211 COMPLEX E/M VISIT ADD ON: HCPCS | Performed by: FAMILY MEDICINE

## 2025-07-15 PROCEDURE — 99214 OFFICE O/P EST MOD 30 MIN: CPT | Performed by: FAMILY MEDICINE

## 2025-07-15 PROCEDURE — 3075F SYST BP GE 130 - 139MM HG: CPT | Performed by: FAMILY MEDICINE

## 2025-07-15 RX ORDER — ROSUVASTATIN CALCIUM 10 MG/1
TABLET, COATED ORAL EVERY 24 HOURS
COMMUNITY
End: 2025-07-15 | Stop reason: SDUPTHER

## 2025-07-15 RX ORDER — PIMECROLIMUS 10 MG/G
1 CREAM TOPICAL 2 TIMES DAILY
Qty: 30 G | Refills: 1 | Status: SHIPPED | OUTPATIENT
Start: 2025-07-15

## 2025-07-15 RX ORDER — DICLOFENAC SODIUM 75 MG/1
75 TABLET, DELAYED RELEASE ORAL 2 TIMES DAILY
Qty: 60 TABLET | Refills: 2 | Status: SHIPPED | OUTPATIENT
Start: 2025-07-15

## 2025-07-15 RX ORDER — FENOFIBRATE 145 MG/1
145 TABLET, FILM COATED ORAL DAILY
Qty: 30 TABLET | Refills: 2 | Status: SHIPPED | OUTPATIENT
Start: 2025-07-15

## 2025-07-15 RX ORDER — LISINOPRIL 10 MG/1
10 TABLET ORAL DAILY
Qty: 90 TABLET | Refills: 1 | Status: SHIPPED | OUTPATIENT
Start: 2025-07-15

## 2025-07-15 RX ORDER — LOTEPREDNOL ETABONATE 3.8 MG/G
1 GEL OPHTHALMIC 2 TIMES DAILY
Start: 2025-07-15 | End: 2025-07-16 | Stop reason: SDUPTHER

## 2025-07-15 RX ORDER — ROSUVASTATIN CALCIUM 10 MG/1
10 TABLET, COATED ORAL DAILY
Qty: 90 TABLET | Refills: 1 | Status: SHIPPED | OUTPATIENT
Start: 2025-07-15

## 2025-07-15 RX ORDER — CITALOPRAM HYDROBROMIDE 20 MG/1
20 TABLET ORAL
Qty: 90 TABLET | Refills: 1 | Status: SHIPPED | OUTPATIENT
Start: 2025-07-15

## 2025-07-15 NOTE — PROGRESS NOTES
Senait Fong presents to [facility].    Chief Complaint:  follow up for BP  Subjective     History of Present Illness:  HPI    She presents with intermittent eczema with dryness, especially on her eyelids.  She uses triamcinolone topical cream prn, She sees an eye specialist and was placed on antihistamine LOTAMAX gel drops and topical steroid and she is much better.  She started her triamincinolone topical steroid cream that has helped.  I recommend she take luke warm showers, use aveeno bid, pat dry, avoid perfumes and dyes in detergents, using moisturizing soap.     She presents with chronic allergies stable on loratadine.    She presents with chronic essential hypertensionon lisinopril, tolerates med well.     She presents with depression/anxiety and she is doing great on celexa, happy, denies anxiety.  Works at [employer].  Denies SI/HI.     She presents with chronic neck pain, improved with getting a new mattress.  Current symptoms is chronic stiffness and pain.  Currently on diclofenac and now having more constipation.  Due for cologuard 2/2026            Result Review   The following data was reviewed by Yas Castillo MD on 07/15/2025.  Lab Results   Component Value Date    WBC 5.46 02/04/2025    HGB 14.2 02/04/2025    HCT 42.6 02/04/2025    MCV 88.8 02/04/2025     02/04/2025     Lab Results   Component Value Date    GLUCOSE 98 06/25/2025    BUN 19.0 06/25/2025    CREATININE 0.88 06/25/2025     06/25/2025    K 4.3 06/25/2025     06/25/2025    CALCIUM 9.9 06/25/2025    PROTEINTOT 6.7 06/25/2025    ALBUMIN 4.5 06/25/2025    ALT 24 06/25/2025    AST 21 06/25/2025    ALKPHOS 36 (L) 06/25/2025    BILITOT 0.4 06/25/2025    GLOB 2.2 06/25/2025    AGRATIO 2.0 06/25/2025    BCR 21.6 06/25/2025    ANIONGAP 9.2 06/25/2025    EGFR 70.4 06/25/2025     Lab Results   Component Value Date    CHOL 182 06/25/2025    CHLPL 134 10/29/2020    TRIG 82 06/25/2025  "   HDL 56 06/25/2025     (H) 06/25/2025     Lab Results   Component Value Date    TSH 1.750 11/23/2022     No results found for: \"HGBA1C\"  No results found for: \"PSA\"  No results found for: \"IRON\"   Lab Results   Component Value Date    JVMI19US 55.8 02/04/2025               Assessment and Plan:   Assessment & Plan  Essential hypertension  BP is well controlled on current medication and treatment plan, tolerates meds well, no changes are needed to current meds or tx plan, will check appropriate labs today.  Encourage healthy diet and daily exercise    Orders:    lisinopril (PRINIVIL,ZESTRIL) 10 MG tablet; Take 1 tablet by mouth Daily.    Pure hypercholesterolemia   Cholesterol is well controlled on current medication and treatment plan, tolerates meds well, no changes are needed to current meds or tx plan, will check appropriate labs today.  Encourage healthy diet and daily exercise    Orders:    fenofibrate (Tricor) 145 MG tablet; Take 1 tablet by mouth Daily.    Neck pain  Doing so much better, will change diclofenac to PRN dosing  Orders:    diclofenac (VOLTAREN) 75 MG EC tablet; Take 1 tablet by mouth 2 (Two) Times a Day.    Major depressive disorder, recurrent, mild      Orders:    citalopram (CeleXA) 20 MG tablet; Take 1 tablet by mouth every night at bedtime.    Intrinsic eczema  Worse on face and eyelids, will send in elidel       Allergic rhinitis, unspecified seasonality, unspecified trigger  Stable on OTC claritan       Primary insomnia  Sleeping well on melotonin                  Objective     Medications:  Current Outpatient Medications   Medication Instructions    ascorbic acid (VITAMIN C) 100 mg, Daily    citalopram (CELEXA) 20 mg, Oral, Every Night at Bedtime    diclofenac (VOLTAREN) 75 mg, Oral, 2 Times Daily    fenofibrate (TRICOR) 145 mg, Oral, Daily    lisinopril (PRINIVIL,ZESTRIL) 10 mg, Oral, Daily    loratadine (CLARITIN) 10 mg, Daily    Lotemax SM 1 g, Both Eyes, 2 Times Daily    " "multivitamin with minerals tablet tablet 1 tablet, Daily    pimecrolimus (Elidel) 1 % cream Apply 1 Application topically to the appropriate area of eyelids as directed 2 (Two) Times a Day.    rosuvastatin (CRESTOR) 10 mg, Oral, Daily    triamcinolone (KENALOG) 0.1 % ointment 1 Application, Topical, 2 Times Daily        Vital Signs:   /82   Pulse 55   Ht 156.8 cm (61.73\")   Wt 76.4 kg (168 lb 6.4 oz)   SpO2 97%   BMI 31.07 kg/m²           BP Readings from Last 3 Encounters:   07/15/25 139/82   01/29/25 142/76   06/13/24 139/78      Wt Readings from Last 3 Encounters:   07/15/25 76.4 kg (168 lb 6.4 oz)   01/29/25 75.8 kg (167 lb)   06/13/24 75.7 kg (166 lb 12.8 oz)        Physical Exam:  Physical Exam  Vitals and nursing note reviewed.   Constitutional:       General: She is not in acute distress.     Appearance: Normal appearance. She is not ill-appearing, toxic-appearing or diaphoretic.   HENT:      Head: Normocephalic and atraumatic.      Right Ear: Tympanic membrane, ear canal and external ear normal.      Left Ear: Tympanic membrane, ear canal and external ear normal.      Nose: No congestion or rhinorrhea.      Mouth/Throat:      Mouth: Mucous membranes are moist.      Pharynx: Oropharynx is clear. No oropharyngeal exudate or posterior oropharyngeal erythema.   Eyes:      Extraocular Movements: Extraocular movements intact.      Conjunctiva/sclera: Conjunctivae normal.      Pupils: Pupils are equal, round, and reactive to light.   Cardiovascular:      Rate and Rhythm: Normal rate and regular rhythm.      Heart sounds: Normal heart sounds.   Pulmonary:      Effort: Pulmonary effort is normal.      Breath sounds: Normal breath sounds. No wheezing, rhonchi or rales.   Abdominal:      General: Abdomen is flat.      Palpations: Abdomen is soft. There is no mass.      Tenderness: There is no abdominal tenderness.      Hernia: No hernia is present.   Musculoskeletal:      Cervical back: Neck supple. No " rigidity.      Right lower leg: No edema.      Left lower leg: No edema.   Lymphadenopathy:      Cervical: No cervical adenopathy.   Skin:     General: Skin is warm and dry.   Neurological:      General: No focal deficit present.      Mental Status: She is alert and oriented to person, place, and time. Mental status is at baseline.   Psychiatric:         Mood and Affect: Mood normal.         Behavior: Behavior normal.         Thought Content: Thought content normal.         Judgment: Judgment normal.           Review of Systems:  Review of Systems   Constitutional:  Negative for chills, diaphoresis, fatigue and fever.   HENT:  Negative for congestion, ear pain, sinus pressure, sore throat and swollen glands.    Eyes:  Negative for blurred vision and double vision.   Respiratory:  Negative for cough, shortness of breath and wheezing.    Cardiovascular:  Negative for chest pain, palpitations and leg swelling.   Gastrointestinal:  Negative for abdominal pain, blood in stool, constipation, diarrhea, nausea and vomiting.   Genitourinary:  Negative for dysuria.   Musculoskeletal:  Positive for neck pain.   Skin:  Positive for rash.   Neurological:  Negative for dizziness, weakness, numbness and headache.   Psychiatric/Behavioral:  Negative for suicidal ideas and depressed mood. The patient is not nervous/anxious.               Follow Up   Return in about 6 months (around 1/15/2026) for Medicare Wellness.    Part of this note may be an electronic transcription/translation of spoken language to printed   text using the Dragon Dictation System.              Health Maintenance   Topic Date Due    Pneumococcal Vaccine 50+ (1 of 1 - PCV) 01/11/2026 (Originally 1/3/2004)    TDAP/TD VACCINES (1 - Tdap) 01/11/2026 (Originally [birth year]/3/1974)    ZOSTER VACCINE (1 of 2) 01/11/2026 (Originally 1/3/2004)    COVID-19 Vaccine (1 - 2024-25 season) 01/15/2026 (Originally 9/1/2024)    INFLUENZA VACCINE  10/01/2025    ANNUAL WELLNESS VISIT   2026    COLORECTAL CANCER SCREENING  2026    LIPID PANEL  2026    MAMMOGRAM  2027    DXA SCAN  2027    HEPATITIS C SCREENING  Completed          Medical History:  Medications Discontinued During This Encounter   Medication Reason    Lotemax SM 0.38 % gel *Therapy completed    lisinopril (PRINIVIL,ZESTRIL) 10 MG tablet Reorder    citalopram (CeleXA) 20 MG tablet Reorder    diclofenac (VOLTAREN) 75 MG EC tablet Reorder    fenofibrate (Tricor) 145 MG tablet Reorder    rosuvastatin (Crestor) 10 MG tablet Reorder      Past Medical History:    Allergic    Exzema    Anxiety    Ongoing    Depression    Ongoing    Hyperlipidemia    Hypertension     Past Surgical History:    CHOLECYSTECTOMY    TONSILLECTOMY      Family History   Problem Relation Age of Onset    Alcohol abuse Father     Anxiety disorder Sister      Social History     Tobacco Use    Smoking status: Former     Average packs/day: 1 pack/day for 15.0 years (15.0 ttl pk-yrs)     Types: Cigarettes     Start date:      Quit date:      Years since quittin.5     Passive exposure: Past    Smokeless tobacco: Never   Substance Use Topics    Alcohol use: Yes     Alcohol/week: 6.0 standard drinks of alcohol     Types: 6 Drinks containing 0.5 oz of alcohol per week       There are no preventive care reminders to display for this patient.       There is no immunization history on file for this patient.    Allergies   Allergen Reactions    Codeine Nausea Only

## 2025-07-15 NOTE — ASSESSMENT & PLAN NOTE
{Depression A/P Block (Optional):39488}    Orders:    citalopram (CeleXA) 20 MG tablet; Take 1 tablet by mouth every night at bedtime.

## 2025-07-15 NOTE — ASSESSMENT & PLAN NOTE
Cholesterol is well controlled on current medication and treatment plan, tolerates meds well, no changes are needed to current meds or tx plan, will check appropriate labs today.  Encourage healthy diet and daily exercise    Orders:    fenofibrate (Tricor) 145 MG tablet; Take 1 tablet by mouth Daily.

## 2025-07-15 NOTE — ASSESSMENT & PLAN NOTE
BP is well controlled on current medication and treatment plan, tolerates meds well, no changes are needed to current meds or tx plan, will check appropriate labs today.  Encourage healthy diet and daily exercise    Orders:    lisinopril (PRINIVIL,ZESTRIL) 10 MG tablet; Take 1 tablet by mouth Daily.

## 2025-07-16 ENCOUNTER — TELEPHONE (OUTPATIENT)
Dept: FAMILY MEDICINE CLINIC | Age: 70
End: 2025-07-16
Payer: MEDICARE

## 2025-07-16 RX ORDER — PIMECROLIMUS 10 MG/G
1 CREAM TOPICAL 2 TIMES DAILY
Qty: 30 G | Refills: 1 | Status: CANCELLED | OUTPATIENT
Start: 2025-07-16

## 2025-07-16 NOTE — TELEPHONE ENCOUNTER
Caller: Senait Fong    Relationship: Self    Best call back number: [phone]    Requested Prescriptions:   Requested Prescriptions     Pending Prescriptions Disp Refills    pimecrolimus (Elidel) 1 % cream 30 g 1     Sig: Apply 1 Application topically to the appropriate area of eyelids as directed 2 (Two) Times a Day.    Lotemax SM 0.38 % gel       Sig: Administer 1 drop to both eyes 2 (Two) Times a Day.        Pharmacy where request should be sent: [facility] [id number] [place], [place] - [address] [facility] - [phone]  - [phone] FX     Last office visit with prescribing clinician: 7/15/2025   Last telemedicine visit with prescribing clinician: Visit date not found   Next office visit with prescribing clinician: Visit date not found     Additional details provided by patient: THE PHARMACY WHERE THE PRESCRIPTIONS WERE SENT DID NOT HAVE THESE MEDICATIONS PLEASE RESEND     Does the patient have less than a 3 day supply:  [x] Yes  [] No    Would you like a call back once the refill request has been completed: [] Yes [x] No    If the office needs to give you a call back, can they leave a voicemail: [] Yes [x] No    Sandra Thompson Rep   07/16/25 09:17 EDT

## 2025-07-17 NOTE — TELEPHONE ENCOUNTER
Caller: Senait Fong    Relationship: Self    Best call back number:     [phone]       What is the best time to reach you: ANYTIME     Who are you requesting to speak with (clinical staff, provider,  specific staff member): CLINIC      What was the call regarding: PATIENT CALLED IN  REQUESTING AN UPDATE ON THIS REQUEST AS SHE IS NOT FEELING ANY BETTER, PLEASE ADVISE

## 2025-07-18 RX ORDER — LOTEPREDNOL ETABONATE 3.8 MG/G
1 GEL OPHTHALMIC 2 TIMES DAILY
Qty: 5 G | Refills: 2 | Status: SHIPPED | OUTPATIENT
Start: 2025-07-18